# Patient Record
Sex: FEMALE | Race: WHITE | NOT HISPANIC OR LATINO | Employment: STUDENT | URBAN - METROPOLITAN AREA
[De-identification: names, ages, dates, MRNs, and addresses within clinical notes are randomized per-mention and may not be internally consistent; named-entity substitution may affect disease eponyms.]

---

## 2021-03-30 ENCOUNTER — TRANSCRIBE ORDERS (OUTPATIENT)
Dept: ADMINISTRATIVE | Facility: HOSPITAL | Age: 26
End: 2021-03-30

## 2021-03-30 DIAGNOSIS — Z13.820 ENCOUNTER FOR SCREENING FOR OSTEOPOROSIS: Primary | ICD-10-CM

## 2021-03-30 DIAGNOSIS — R19.00 INTRA-ABDOMINAL AND PELVIC SWELLING, MASS AND LUMP, UNSPECIFIED SITE: ICD-10-CM

## 2021-04-01 ENCOUNTER — HOSPITAL ENCOUNTER (OUTPATIENT)
Dept: ULTRASOUND IMAGING | Facility: CLINIC | Age: 26
Discharge: HOME | End: 2021-04-01
Payer: COMMERCIAL

## 2021-04-01 DIAGNOSIS — R19.00 INTRA-ABDOMINAL AND PELVIC SWELLING, MASS AND LUMP, UNSPECIFIED SITE: ICD-10-CM

## 2021-04-01 PROCEDURE — 76830 TRANSVAGINAL US NON-OB: CPT

## 2021-04-01 PROCEDURE — 76856 US EXAM PELVIC COMPLETE: CPT

## 2022-11-22 ENCOUNTER — OFFICE VISIT (OUTPATIENT)
Dept: INTERNAL MEDICINE CLINIC | Facility: CLINIC | Age: 27
End: 2022-11-22

## 2022-11-22 VITALS
WEIGHT: 189 LBS | RESPIRATION RATE: 18 BRPM | TEMPERATURE: 98 F | OXYGEN SATURATION: 96 % | SYSTOLIC BLOOD PRESSURE: 122 MMHG | HEART RATE: 76 BPM | DIASTOLIC BLOOD PRESSURE: 78 MMHG

## 2022-11-22 DIAGNOSIS — F90.9 ATTENTION DEFICIT HYPERACTIVITY DISORDER (ADHD), UNSPECIFIED ADHD TYPE: ICD-10-CM

## 2022-11-22 DIAGNOSIS — N80.9 ENDOMETRIOSIS: ICD-10-CM

## 2022-11-22 DIAGNOSIS — Z00.00 ANNUAL PHYSICAL EXAM: Primary | ICD-10-CM

## 2022-11-22 DIAGNOSIS — F41.9 ANXIETY: ICD-10-CM

## 2022-11-22 RX ORDER — ALPRAZOLAM 0.5 MG/1
0.5 TABLET ORAL 4 TIMES DAILY PRN
COMMUNITY
Start: 2022-11-10

## 2022-11-22 RX ORDER — DEXTROAMPHETAMINE SACCHARATE, AMPHETAMINE ASPARTATE, DEXTROAMPHETAMINE SULFATE AND AMPHETAMINE SULFATE 5; 5; 5; 5 MG/1; MG/1; MG/1; MG/1
20 TABLET ORAL
COMMUNITY

## 2022-11-22 RX ORDER — PAROXETINE 10 MG/1
10 TABLET, FILM COATED ORAL EVERY MORNING
COMMUNITY
Start: 2022-11-10

## 2022-11-22 RX ORDER — ACETAMINOPHEN AND CODEINE PHOSPHATE 120; 12 MG/5ML; MG/5ML
SOLUTION ORAL
COMMUNITY
Start: 2022-11-15

## 2022-11-22 NOTE — PROGRESS NOTES
ADULT ANNUAL [address]     NAME: Rosita Tan  AGE: 32 y o  SEX: female  : 1995     DATE: 2022     Assessment and Plan:     Problem List Items Addressed This Visit    None  Visit Diagnoses     Annual physical exam    -  Primary    Relevant Orders    Comprehensive metabolic panel    Anxiety        Attention deficit hyperactivity disorder (ADHD), unspecified ADHD type        Relevant Medications    ALPRAZolam (XANAX) 0 5 mg tablet    PARoxetine (PAXIL) 10 mg tablet    amphetamine-dextroamphetamine (ADDERALL) 20 mg tablet    Endometriosis        Relevant Orders    Ambulatory Referral to Obstetrics / Gynecology      refills provided  Referrals placed  Screening labs ordered  Immunizations and preventive care screenings were discussed with patient today  Appropriate education was printed on patient's after visit summary  Counseling:  Dental Health: discussed importance of regular tooth brushing, flossing, and dental visits  · Exercise: the importance of regular exercise/physical activity was discussed  Recommend exercise 3-5 times per week for at least 30 minutes  Return for Annual physical      Chief Complaint:     Chief Complaint   Patient presents with   • Physical Exam   • Establish Care     Pt states that she relocated here to start care also need referral to gyn a      History of Present Illness:     Adult Annual Physical   Patient here for a comprehensive physical exam    Reports hx of adhd and axienty    No longer seeing a psychiatrists  Was diagnosed by psych  Takes adderall infreuqent espically now that she is out of school   Has been on these therapies for 11 years   Breast cancer on paternal side     Diet and Physical Activity  · Diet/Nutrition: limited junk food  · Exercise: walking        Depression Screening  PHQ-2/9 Depression Screening    Little interest or pleasure in doing things: 0 - not at all  Feeling down, depressed, or hopeless: 0 - not at all  PHQ-2 Score: 0  PHQ-2 Interpretation: Negative depression screen       General Health  · Sleep: gets 4-6 hours of sleep on average  · Hearing: normal - bilateral   · Vision: wears glasses  · Dental: no dental visits for >1 year  /GYN Health  · Last menstrual period: 10/27/2022  · Contraceptive method: oral contraceptives  · History of endometrosis      Review of Systems:     Review of Systems   Respiratory: Negative for shortness of breath  Cardiovascular: Negative for chest pain  Gastrointestinal: Negative for constipation and diarrhea  Musculoskeletal: Negative for arthralgias and gait problem  Neurological: Negative for headaches  Past Medical History:     History reviewed  No pertinent past medical history     Past Surgical History:     Past Surgical History:   Procedure Laterality Date   • ENDOMETRIAL BIOPSY        Social History:     Social History     Socioeconomic History   • Marital status: Single     Spouse name: None   • Number of children: None   • Years of education: None   • Highest education level: None   Occupational History   • None   Tobacco Use   • Smoking status: Never   • Smokeless tobacco: Never   Vaping Use   • Vaping Use: Former   • Substances: Nicotine   Substance and Sexual Activity   • Alcohol use: Yes     Comment: social   • Drug use: Never   • Sexual activity: None   Other Topics Concern   • None   Social History Narrative   • None     Social Determinants of Health     Financial Resource Strain: Not on file   Food Insecurity: Not on file   Transportation Needs: Not on file   Physical Activity: Not on file   Stress: Not on file   Social Connections: Not on file   Intimate Partner Violence: Not on file   Housing Stability: Not on file      Family History:     Family History   Problem Relation Age of Onset   • Hypertension Father       Current Medications:     Current Outpatient Medications Medication Sig Dispense Refill   • ALPRAZolam (XANAX) 0 5 mg tablet Take 0 5 mg by mouth 4 (four) times a day as needed     • amphetamine-dextroamphetamine (ADDERALL) 20 mg tablet Take 20 mg by mouth 2 (two) times a day Take half once a day     • norethindrone (MICRONOR) 0 35 MG tablet      • PARoxetine (PAXIL) 10 mg tablet Take 10 mg by mouth every morning       No current facility-administered medications for this visit  Allergies: Allergies   Allergen Reactions   • Lactose - Food Allergy Abdominal Pain     Stomach pain no hives      Physical Exam:     /78 (BP Location: Left arm, Patient Position: Sitting, Cuff Size: Standard)   Pulse 76   Temp 98 °F (36 7 °C) (Temporal)   Resp 18   Wt 85 7 kg (189 lb)   SpO2 96%     Physical Exam  HENT:      Head: Normocephalic and atraumatic  Right Ear: External ear normal       Left Ear: External ear normal    Eyes:      Conjunctiva/sclera: Conjunctivae normal       Pupils: Pupils are equal, round, and reactive to light  Cardiovascular:      Rate and Rhythm: Normal rate and regular rhythm  Heart sounds: No murmur heard  Pulmonary:      Effort: Pulmonary effort is normal       Breath sounds: Normal breath sounds  Abdominal:      General: Bowel sounds are normal       Palpations: Abdomen is soft  Musculoskeletal:      Right lower leg: No edema  Left lower leg: No edema  Neurological:      Mental Status: She is alert and oriented to person, place, and time     Psychiatric:         Mood and Affect: Mood normal          Behavior: Behavior normal           Daniel Dotson DO   [facility]

## 2022-11-22 NOTE — PATIENT INSTRUCTIONS

## 2022-12-19 ENCOUNTER — TELEPHONE (OUTPATIENT)
Dept: INTERNAL MEDICINE CLINIC | Facility: CLINIC | Age: 27
End: 2022-12-19

## 2022-12-20 DIAGNOSIS — F41.9 ANXIETY: Primary | ICD-10-CM

## 2022-12-20 RX ORDER — ALPRAZOLAM 0.5 MG/1
0.5 TABLET ORAL 3 TIMES DAILY PRN
Qty: 90 TABLET | Refills: 0 | Status: SHIPPED | OUTPATIENT
Start: 2022-12-20

## 2023-01-12 ENCOUNTER — OFFICE VISIT (OUTPATIENT)
Dept: OBGYN CLINIC | Age: 28
End: 2023-01-12

## 2023-01-12 VITALS
DIASTOLIC BLOOD PRESSURE: 70 MMHG | SYSTOLIC BLOOD PRESSURE: 108 MMHG | BODY MASS INDEX: 37.5 KG/M2 | HEIGHT: 60 IN | WEIGHT: 191 LBS

## 2023-01-12 DIAGNOSIS — Z12.4 SCREENING FOR CERVICAL CANCER: ICD-10-CM

## 2023-01-12 DIAGNOSIS — Z01.419 ENCOUNTER FOR ANNUAL ROUTINE GYNECOLOGICAL EXAMINATION: Primary | ICD-10-CM

## 2023-01-12 DIAGNOSIS — N80.9 ENDOMETRIOSIS: ICD-10-CM

## 2023-01-12 RX ORDER — NORGESTIMATE AND ETHINYL ESTRADIOL 7DAYSX3 LO
1 KIT ORAL DAILY
Qty: 84 TABLET | Refills: 0 | Status: SHIPPED | OUTPATIENT
Start: 2023-01-12

## 2023-01-12 RX ORDER — ALBUTEROL SULFATE 90 UG/1
AEROSOL, METERED RESPIRATORY (INHALATION)
COMMUNITY
Start: 2022-12-07

## 2023-01-12 NOTE — PATIENT INSTRUCTIONS
Prophylactic NSAID therapy for Painful or Heavy menses     Ibuprofen or Naproxen (chose 1 or the other, do not take both), Dose as noted on the box  Typically Ibuprofen dose is 600 mg, (3 tablets) every 6-8 hours  Typically Naproxen dose is 500 mg every 12 hours  Start taking medication 2 days prior to onset of menses and continue taking through the first 3 days of menses  Make sure you take consistently this is important  You need to take with food to decrease any gastrointestinal upset effects    This is proven therapy to reduce you flow and cramping by 50 %    Life style changes that have a positive effect on painful and heavy periods are as follows   Daily physical exercise    Increase fiber, fresh fruits and vegetables in your diet    Increase daily water intake    Heating pads(do not apply directly to skin, apply over clothing or towel)   Warm Baths   Relaxation techniques, meditation, massage, yoga and mindfulness     These are all suggestion for improving your sense of frustrations with your menstrual cycle and improving your overall wellness and lifestyle   Birth Control Pills   WHAT YOU NEED TO KNOW:   What are birth control pills? Birth control pills are also called oral contraceptives, or the pill  It is medicine that helps prevent pregnancy by stopping ovulation  Ovulation is when the ovaries make and release an egg cell each month  If this egg gets fertilized by sperm, pregnancy occurs  You will need to take the pill at the same time every day  Your healthcare provider will tell you when to start taking the pill  You will also be told what to do if you miss a dose  Instructions will depend on the kind of birth control pills you are taking  What are the different kinds of birth control pills? Some kinds are taken for 21 days in a row, followed by 7 days of placebo (no hormones) pills  Other kinds are taken for 24 days followed by 4 days of placebos   Each kind has a certain amount of female hormones  Your provider will decide on the kind that is best for you based on your age and other health conditions  What may be done before I can start taking birth control pills? You need to see your healthcare provider to get a prescription  Any of the following may be done before your healthcare provider gives you a prescription:  Your healthcare provider will ask about diseases and illnesses you have had in the past  Your provider will check your risk for blood clots, heart conditions, or stroke  Tell your provider if you had gastric bypass surgery  This surgery can affect the way your body absorbs medicines such as birth control pills  Your provider will also check your blood pressure, and may do a breast and pelvic exam  A Pap smear may also be done during the pelvic exam  This is a test to make sure you do not have abnormal changes on your cervix  You may need other tests, such as a urine test to make sure you are not pregnant  Your provider will ask if you take any medicines and if you smoke  Smoking increases your risk for stroke, heart attack, or a blood clot in your lungs  If you smoke, you should not take certain kinds of birth control pills  What are the advantages of birth control pills? When birth control pills are used correctly, the chances of getting pregnant are very low  Birth control pills may help decrease bleeding and pain during your monthly period  They may also help prevent cancer of the uterus and ovaries  What are the disadvantages of birth control pills? You may have sudden changes in your mood or feelings while you take birth control pills  You may have nausea and a decreased sex drive  You may have an increased appetite and rapid weight gain  You may also have bleeding in between periods, less frequent periods, vaginal dryness, and breast pain  Birth control pills will not protect you from sexually transmitted infections   Rarely, some birth control pills can increase your risk for a blood clot  This may become life-threatening  What should I do if I decide I want to get pregnant? If you are planning to have a baby, ask your healthcare provider when you may stop taking your birth control pills  It may take some time for you to start ovulating again  Ask your healthcare provider for more information about pregnancy after birth control pills  When should I start taking birth control pills after I have a baby? If you are not breastfeeding, you may start taking birth control pills 3 weeks after you give birth  You may be able to take certain types of birth control pills if you are breastfeeding  These pills can be started from 6 weeks to 6 months after you give birth  Ask your healthcare provider for more information about when to start taking birth control pills after you give birth  What do I need to know about birth control pills and menopause? Talk with your healthcare provider if you want to take birth control pills around menopause  Around age 39, you will enter into perimenopause  This means your hormone levels are dropping and you are ovulating less often  You can still become pregnant during this time  The risk for problems, such as miscarriage, are higher if you become pregnant after age 39  Birth control pills will prevent pregnancy, and may also help prevent or relieve some signs and symptoms of menopause  Examples are hot flashes and mood swings  Your provider will do tests when you are around age 48  The tests may show that you are in menopause  If the tests do not show menopause for sure, you may be able to continue taking the pill up to age 54  The decision will depend on your health and if you have any medical conditions, such as a blood clot  Call your local emergency number (911 in the [address] [place] [address],[address]) for any of the following:    You have any of the following signs of a stroke:      Numbness or drooping on one side of your face     Weakness in an arm or leg    Confusion or difficulty speaking    Dizziness, a severe headache, or vision loss    You feel lightheaded, short of breath, and have chest pain  You cough up blood  When should I seek immediate care? Your arm or leg feels warm, tender, and painful  It may look swollen and red  You have severe pain, numbness, or swelling in your arms or legs  When should I call my doctor? You have forgotten to take a birth control pill  You have mood changes, such as depression, since starting birth control pills  You have nausea or are vomiting  You have severe abdominal pain  You missed a period and have questions or concerns about being pregnant  You still have bleeding 4 months after taking birth control pills correctly  You have questions or concerns about your condition or care  CARE AGREEMENT:   You have the right to help plan your care  Learn about your health condition and how it may be treated  Discuss treatment options with your healthcare providers to decide what care you want to receive  You always have the right to refuse treatment  The above information is an  only  It is not intended as medical advice for individual conditions or treatments  Talk to your doctor, nurse or pharmacist before following any medical regimen to see if it is safe and effective for you  © Copyright [employer] 2022 Information is for End User's use only and may not be sold, redistributed or otherwise used for commercial purposes   All illustrations and images included in CareNotes® are the copyrighted property of A D A M , Inc  or [address] [place] [address] [facility]

## 2023-01-12 NOTE — PROGRESS NOTES
Bernardo Melton  1995    Assessment and Plan:  Yearly exam    Discussed switching BCM - patient opted for a trial of OCPs  Discussed ACHES  F/u in 3 months for pill check  NSAIDs recommended for dysmenorrhea   ASCCP guidelines reviewed- pap collected today    Perineal hygiene reviewed  Kegel exercises recommended  SBE, daily exercise and healthy diet with adequate calcium and vitamin D encouraged  Weight bearing exercises a minimum of 150 minutes/week advised  Advised to call with any issues, all concerns & questions addressed  See provided information in your after visit summary     Jamaal Gauthier was seen today for gynecologic exam     Diagnoses and all orders for this visit:    Encounter for annual routine gynecological examination    Screening for cervical cancer  -     Liquid-based pap, screening    Endometriosis  -     Ambulatory Referral to Obstetrics / Gynecology  -     norgestimate-ethinyl estradiol (Ortho Tri-Cyclen Lo) 0 18/0 215/0 25 MG-25 MCG per tablet; Take 1 tablet by mouth daily      F/U in 3 months for pill check    Health Maintenance:    Last PAP: Not on file  Denies history of abnormal paps  Next PAP Due: collected today  Gardasil Vaccine Series: She has had the Gardasil series  Subjective    CC: Yearly Exam     Bernardo Melton is a 32 y o  female  here for an annual exam       Eliceo Claire  Patient's last menstrual period was 12/27/2022  Sexual activity: She is sexually active without pain, bleeding or dryness  Contraception: POPs  STD testing:  She does not want STD testing today  former smoker    History of laparoscopy diagnosed endometriosis  Currently on POPs  Reports worsening pelvic pain over the last couple months with her periods and midcycle  Menstrual cycle is regular  Periods typically last 7 days       She denies breast concerns, abnormal vaginal discharge, vaginal itching, odor, irritation, bowel/bladder dysfunction, urinary symptoms, pelvic pain, or dyspareunia today  Family hx of breast cancer: Yes Paternal grandmother  Family hx of ovarian cancer: No  Family hx of colon cancer: No    Past Medical History:   Diagnosis Date   • Endometriosis      Past Surgical History:   Procedure Laterality Date   • ENDOMETRIAL BIOPSY     • LAPAROSCOPY     • NOSE SURGERY     • TONSILLECTOMY         Immunization History   Administered Date(s) Administered   • COVID-19 PFIZER VACCINE 0 3 ML IM 2022       Family History   Problem Relation Age of Onset   • Hypertension Father      Social History     Tobacco Use   • Smoking status: Never   • Smokeless tobacco: Never   • Tobacco comments:     Stopped vaping 2023!!    Vaping Use   • Vaping Use: Former   • Substances: Nicotine   Substance Use Topics   • Alcohol use: Yes     Comment: social   • Drug use: Never       Current Outpatient Medications:   •  norgestimate-ethinyl estradiol (Ortho Tri-Cyclen Lo) 0 18/0 215/0 25 MG-25 MCG per tablet, Take 1 tablet by mouth daily, Disp: 84 tablet, Rfl: 0  •  albuterol (PROVENTIL HFA,VENTOLIN HFA) 90 mcg/act inhaler, TAKE 2 PUFFS (INHALATION) EVERY 4 TO 6 HOURS FOR 10 DAYS, Disp: , Rfl:   •  ALPRAZolam (XANAX) 0 5 mg tablet, Take 1 tablet (0 5 mg total) by mouth 3 (three) times a day as needed for anxiety, Disp: 90 tablet, Rfl: 0  •  amphetamine-dextroamphetamine (ADDERALL) 20 mg tablet, Take 20 mg by mouth 2 (two) times a day Take half once a day, Disp: , Rfl:   •  PARoxetine (PAXIL) 10 mg tablet, Take 10 mg by mouth every morning, Disp: , Rfl:   Patient Active Problem List    Diagnosis Date Noted   • Endometriosis 04/15/2019       Allergies   Allergen Reactions   • Lactose - Food Allergy Abdominal Pain     Stomach pain no hives       OB History    Para Term  AB Living   0 0 0 0 0 0   SAB IAB Ectopic Multiple Live Births   0 0 0 0 0       Vitals:    23 0700   BP: 108/70   Weight: 86 6 kg (191 lb)   Height: 5' (1 524 m)     Body mass index is 37 3 kg/m²  Review of Systems   Constitutional: Negative for chills, fatigue, fever and unexpected weight change  Respiratory: Negative for shortness of breath  Cardiovascular: Negative for chest pain  Gastrointestinal: Negative for abdominal pain, constipation, diarrhea, nausea and vomiting  Endocrine: Negative  Genitourinary: Positive for pelvic pain  Negative for difficulty urinating, dyspareunia, dysuria, frequency, genital sores, hematuria, menstrual problem, urgency, vaginal bleeding, vaginal discharge and vaginal pain  Musculoskeletal: Negative for back pain and myalgias  Skin: Negative for pallor and rash  Neurological: Negative for headaches  Psychiatric/Behavioral: Negative for dysphoric mood  All other systems reviewed and are negative  Physical Exam  Constitutional:       General: She is not in acute distress  Appearance: Normal appearance  She is not ill-appearing  Genitourinary:      Bladder and urethral meatus normal       No lesions in the vagina  Right Labia: No rash, tenderness, lesions, skin changes or Bartholin's cyst      Left Labia: No tenderness, lesions, skin changes, Bartholin's cyst or rash  No inguinal adenopathy present in the right or left side  No vaginal discharge, erythema, tenderness, bleeding or ulceration  Right Adnexa: not tender, not full and no mass present  Left Adnexa: not tender, not full and no mass present  No cervical motion tenderness, discharge, friability, lesion, polyp or nabothian cyst       Uterus is not enlarged, fixed or tender  No uterine mass detected  No urethral prolapse, tenderness or discharge present  Bladder is not tender and urgency on palpation not present  Pelvic exam was performed with patient in the lithotomy position  Breasts:     Right: No swelling, inverted nipple, mass, nipple discharge, skin change or tenderness        Left: No swelling, inverted nipple, mass, nipple discharge, skin change or tenderness  HENT:      Head: Normocephalic and atraumatic  Eyes:      Conjunctiva/sclera: Conjunctivae normal    Pulmonary:      Effort: Pulmonary effort is normal    Abdominal:      General: There is no distension  Palpations: Abdomen is soft  Tenderness: There is no abdominal tenderness  Musculoskeletal:         General: Normal range of motion  Cervical back: Neck supple  Lymphadenopathy:      Upper Body:      Right upper body: No supraclavicular or axillary adenopathy  Left upper body: No supraclavicular or axillary adenopathy  Lower Body: No right inguinal adenopathy  No left inguinal adenopathy  Neurological:      Mental Status: She is alert and oriented to person, place, and time  Skin:     General: Skin is warm and dry  Psychiatric:         Mood and Affect: Mood normal          Behavior: Behavior normal          Thought Content: Thought content normal          Judgment: Judgment normal    Vitals and nursing note reviewed

## 2023-01-20 LAB
LAB AP GYN PRIMARY INTERPRETATION: NORMAL
Lab: NORMAL

## 2023-01-25 DIAGNOSIS — F41.9 ANXIETY: ICD-10-CM

## 2023-01-25 RX ORDER — ALPRAZOLAM 0.5 MG/1
TABLET ORAL
Qty: 90 TABLET | Refills: 0 | Status: SHIPPED | OUTPATIENT
Start: 2023-01-25

## 2023-02-27 DIAGNOSIS — F41.9 ANXIETY: ICD-10-CM

## 2023-02-28 RX ORDER — ALPRAZOLAM 0.5 MG/1
TABLET ORAL
Qty: 90 TABLET | Refills: 0 | Status: SHIPPED | OUTPATIENT
Start: 2023-02-28

## 2023-03-07 ENCOUNTER — TELEPHONE (OUTPATIENT)
Dept: INTERNAL MEDICINE CLINIC | Facility: CLINIC | Age: 28
End: 2023-03-07

## 2023-03-07 DIAGNOSIS — F90.9 ATTENTION DEFICIT HYPERACTIVITY DISORDER (ADHD), UNSPECIFIED ADHD TYPE: Primary | ICD-10-CM

## 2023-03-08 RX ORDER — PAROXETINE 10 MG/1
10 TABLET, FILM COATED ORAL EVERY MORNING
Qty: 30 TABLET | Refills: 0 | Status: SHIPPED | OUTPATIENT
Start: 2023-03-08

## 2023-03-30 DIAGNOSIS — F41.9 ANXIETY: ICD-10-CM

## 2023-03-31 DIAGNOSIS — F90.9 ATTENTION DEFICIT HYPERACTIVITY DISORDER (ADHD), UNSPECIFIED ADHD TYPE: ICD-10-CM

## 2023-03-31 RX ORDER — PAROXETINE 10 MG/1
10 TABLET, FILM COATED ORAL EVERY MORNING
Qty: 30 TABLET | Refills: 0 | Status: SHIPPED | OUTPATIENT
Start: 2023-03-31

## 2023-03-31 RX ORDER — ALPRAZOLAM 0.5 MG/1
TABLET ORAL
Qty: 90 TABLET | Refills: 0 | Status: SHIPPED | OUTPATIENT
Start: 2023-03-31

## 2023-04-05 DIAGNOSIS — N80.9 ENDOMETRIOSIS: ICD-10-CM

## 2023-04-05 RX ORDER — NORGESTIMATE AND ETHINYL ESTRADIOL 7DAYSX3 LO
1 KIT ORAL DAILY
Qty: 28 TABLET | Refills: 0 | Status: SHIPPED | OUTPATIENT
Start: 2023-04-05 | End: 2023-04-05 | Stop reason: SDUPTHER

## 2023-04-05 RX ORDER — NORGESTIMATE AND ETHINYL ESTRADIOL 7DAYSX3 LO
1 KIT ORAL DAILY
Qty: 28 TABLET | Refills: 0 | Status: SHIPPED | OUTPATIENT
Start: 2023-04-05

## 2023-04-19 PROBLEM — Z30.41 ENCOUNTER FOR SURVEILLANCE OF CONTRACEPTIVE PILLS: Status: ACTIVE | Noted: 2023-04-19

## 2023-05-01 DIAGNOSIS — F41.9 ANXIETY: ICD-10-CM

## 2023-05-02 DIAGNOSIS — F41.9 ANXIETY: ICD-10-CM

## 2023-05-02 RX ORDER — ALPRAZOLAM 0.5 MG/1
0.5 TABLET ORAL 3 TIMES DAILY PRN
Qty: 90 TABLET | Refills: 0 | OUTPATIENT
Start: 2023-05-02

## 2023-05-02 RX ORDER — ALPRAZOLAM 0.5 MG/1
TABLET ORAL
Qty: 90 TABLET | Refills: 0 | Status: SHIPPED | OUTPATIENT
Start: 2023-05-02

## 2023-05-24 DIAGNOSIS — F90.9 ATTENTION DEFICIT HYPERACTIVITY DISORDER (ADHD), UNSPECIFIED ADHD TYPE: ICD-10-CM

## 2023-05-25 RX ORDER — PAROXETINE 10 MG/1
10 TABLET, FILM COATED ORAL EVERY MORNING
Qty: 90 TABLET | Refills: 1 | Status: SHIPPED | OUTPATIENT
Start: 2023-05-25

## 2023-06-01 DIAGNOSIS — F41.9 ANXIETY: ICD-10-CM

## 2023-06-02 RX ORDER — ALPRAZOLAM 0.5 MG/1
0.5 TABLET ORAL 3 TIMES DAILY PRN
Qty: 90 TABLET | Refills: 0 | Status: SHIPPED | OUTPATIENT
Start: 2023-06-02

## 2023-07-03 DIAGNOSIS — F41.9 ANXIETY: ICD-10-CM

## 2023-07-03 RX ORDER — ALPRAZOLAM 0.5 MG/1
0.5 TABLET ORAL 3 TIMES DAILY PRN
Qty: 90 TABLET | Refills: 0 | Status: SHIPPED | OUTPATIENT
Start: 2023-07-03

## 2023-08-01 NOTE — TELEPHONE ENCOUNTER
The XANAX 0 point 5 was refilled on 5/2, but with her local pharmacy in [place]     Pt is out of town, visiting family for a wedding    Please send to:   Nani Sanchez 77  P#: [phone] 01-Aug-2023 17:17

## 2023-08-07 DIAGNOSIS — F41.9 ANXIETY: ICD-10-CM

## 2023-08-08 ENCOUNTER — TELEPHONE (OUTPATIENT)
Age: 28
End: 2023-08-08

## 2023-08-08 RX ORDER — ALPRAZOLAM 0.5 MG/1
0.5 TABLET ORAL 3 TIMES DAILY PRN
Qty: 90 TABLET | Refills: 0 | Status: SHIPPED | OUTPATIENT
Start: 2023-08-08

## 2023-09-06 DIAGNOSIS — F41.9 ANXIETY: ICD-10-CM

## 2023-09-07 RX ORDER — ALPRAZOLAM 0.5 MG/1
0.5 TABLET ORAL 3 TIMES DAILY PRN
Qty: 90 TABLET | Refills: 0 | Status: SHIPPED | OUTPATIENT
Start: 2023-09-07

## 2023-10-06 DIAGNOSIS — F41.9 ANXIETY: ICD-10-CM

## 2023-10-10 RX ORDER — ALPRAZOLAM 0.5 MG/1
0.5 TABLET ORAL 3 TIMES DAILY PRN
Qty: 90 TABLET | Refills: 0 | OUTPATIENT
Start: 2023-10-10

## 2023-10-11 ENCOUNTER — OFFICE VISIT (OUTPATIENT)
Age: 28
End: 2023-10-11
Payer: COMMERCIAL

## 2023-10-11 VITALS
HEIGHT: 60 IN | SYSTOLIC BLOOD PRESSURE: 102 MMHG | TEMPERATURE: 97 F | BODY MASS INDEX: 36.52 KG/M2 | HEART RATE: 66 BPM | DIASTOLIC BLOOD PRESSURE: 68 MMHG | WEIGHT: 186 LBS | OXYGEN SATURATION: 99 % | RESPIRATION RATE: 18 BRPM

## 2023-10-11 DIAGNOSIS — Z13.6 SCREENING FOR CARDIOVASCULAR CONDITION: ICD-10-CM

## 2023-10-11 DIAGNOSIS — Z00.00 ANNUAL PHYSICAL EXAM: ICD-10-CM

## 2023-10-11 DIAGNOSIS — L30.8 OTHER ECZEMA: ICD-10-CM

## 2023-10-11 DIAGNOSIS — L40.9 PSORIASIS: ICD-10-CM

## 2023-10-11 DIAGNOSIS — F41.9 ANXIETY: Primary | ICD-10-CM

## 2023-10-11 PROBLEM — F32.A ANXIETY AND DEPRESSION: Status: ACTIVE | Noted: 2023-10-11

## 2023-10-11 PROBLEM — L30.9 ECZEMA: Status: ACTIVE | Noted: 2023-10-11

## 2023-10-11 PROCEDURE — 99213 OFFICE O/P EST LOW 20 MIN: CPT

## 2023-10-11 RX ORDER — CLOBETASOL PROPIONATE 0.5 MG/G
CREAM TOPICAL 2 TIMES DAILY
Qty: 60 G | Refills: 0 | Status: SHIPPED | OUTPATIENT
Start: 2023-10-11

## 2023-10-11 RX ORDER — ALPRAZOLAM 0.5 MG/1
0.5 TABLET ORAL 3 TIMES DAILY PRN
Qty: 90 TABLET | Refills: 0 | Status: SHIPPED | OUTPATIENT
Start: 2023-10-11

## 2023-10-11 NOTE — PROGRESS NOTES
Name: Eduardo Goodman      : 1995      MRN: [id number]  Encounter Provider: ALBERTO Pabon  Encounter Date: 10/11/2023   Encounter department: [address]      1. Anxiety  Here for medication check, denies acute flare. Stable with Paxil and xanax as needed. - ALPRAZolam (XANAX) 0.5 mg tablet; Take 1 tablet (0.5 mg total) by mouth 3 (three) times a day as needed for anxiety  Dispense: 90 tablet; Refill: 0    2. Other eczema  No relief with triamcinolone. - clobetasol (TEMOVATE) 0.05 % cream; Apply topically 2 (two) times a day  Dispense: 60 g; Refill: 0    3. Psoriasis  Itchy, dry, silvery patches on the  right planter and rhodes surface, no relief from topical steroids. - Ambulatory Referral to Dermatology; Future  - Roflumilast 0.3 % CREA; Apply 1 Application topically 1 (one) time for 1 dose  Dispense: 60 g; Refill: 0    4. Screening for cardiovascular condition  Blood work ordered to be completed before annual visit  - Lipid Panel with Direct LDL reflex; Future    5. Annual physical exam    - CBC and differential; Future  - Lipid Panel with Direct LDL reflex; Future  - TSH, 3rd generation with Free T4 reflex; Future    6. BMI 36.0-36.9,adult  - Lipid Panel with Direct LDL reflex; Future  - TSH, 3rd generation with Free T4 reflex; Future     Subjective      Rash  This is a chronic problem. The current episode started more than 1 year ago. The problem has been waxing and waning since onset. The affected locations include the scalp, right hand and right foot. The problem is moderate. The rash is characterized by scaling, peeling and itchiness. She was exposed to nothing. Associated symptoms include itching. Pertinent negatives include no anorexia, cough, fatigue, fever, shortness of breath, sore throat or vomiting. Past treatments include topical steroids. The treatment provided no relief. Her past medical history is significant for eczema. Review of Systems   Constitutional:  Negative for chills, fatigue and fever. HENT: Negative. Negative for ear pain and sore throat. Eyes:  Negative for pain and visual disturbance. Respiratory:  Negative for cough and shortness of breath. Cardiovascular:  Negative for chest pain and palpitations. Gastrointestinal:  Negative for abdominal pain, anorexia and vomiting. Genitourinary: Negative. Musculoskeletal:  Negative for arthralgias and back pain. Skin:  Positive for itching and rash. Negative for color change. Neurological: Negative. Negative for seizures and syncope. Psychiatric/Behavioral:  The patient is nervous/anxious. All other systems reviewed and are negative. Current Outpatient Medications on File Prior to Visit   Medication Sig   • norethindrone (Rukhsana) 0.35 MG tablet Take 1 tablet (0.35 mg total) by mouth daily   • PARoxetine (PAXIL) 10 mg tablet TAKE 1 TABLET (10 MG TOTAL) BY MOUTH EVERY MORNING. • [DISCONTINUED] ALPRAZolam (XANAX) 0.5 mg tablet Take 1 tablet (0.5 mg total) by mouth 3 (three) times a day as needed for anxiety   • [DISCONTINUED] amphetamine-dextroamphetamine (ADDERALL) 20 mg tablet Take 20 mg by mouth 2 (two) times a day Take half once a day   • albuterol (PROVENTIL HFA,VENTOLIN HFA) 90 mcg/act inhaler TAKE 2 PUFFS (INHALATION) EVERY 4 TO 6 HOURS FOR 10 DAYS (Patient not taking: Reported on 4/19/2023)       Objective     /68 (BP Location: Left arm, Patient Position: Sitting, Cuff Size: Standard)   Pulse 66   Temp (!) 97 °F (36.1 °C) (Tympanic)   Resp 18   Ht 5' (1.524 m)   Wt 84.4 kg (186 lb)   SpO2 99%   BMI 36.33 kg/m²     Physical Exam  Vitals and nursing note reviewed. Constitutional:       Appearance: Normal appearance. HENT:      Head: Normocephalic and atraumatic. Right Ear: Tympanic membrane normal.      Left Ear: Tympanic membrane normal.   Eyes:      Extraocular Movements: Extraocular movements intact.       Pupils: Pupils are equal, round, and reactive to light. Cardiovascular:      Rate and Rhythm: Normal rate and regular rhythm. Pulses: Normal pulses. Heart sounds: Normal heart sounds. Pulmonary:      Effort: Pulmonary effort is normal.      Breath sounds: Normal breath sounds. Musculoskeletal:         General: Normal range of motion. Cervical back: Normal range of motion and neck supple. Skin:     General: Skin is warm and dry. Findings: Rash present. Neurological:      General: No focal deficit present. Mental Status: She is alert and oriented to person, place, and time.    Psychiatric:         Mood and Affect: Mood normal.         Behavior: Behavior normal.       ALBERTO Brenner

## 2023-10-13 ENCOUNTER — TELEPHONE (OUTPATIENT)
Age: 28
End: 2023-10-13

## 2023-11-13 DIAGNOSIS — F41.9 ANXIETY: ICD-10-CM

## 2023-11-13 NOTE — TELEPHONE ENCOUNTER
Medication:Xanax        Medication failed [facility] protocol. Please forward to your office staff for further review as this medication was reviewed by a [facility] RN.

## 2023-11-15 RX ORDER — ALPRAZOLAM 0.5 MG/1
0.5 TABLET ORAL 3 TIMES DAILY PRN
Qty: 90 TABLET | Refills: 0 | Status: SHIPPED | OUTPATIENT
Start: 2023-11-15

## 2023-12-02 DIAGNOSIS — F90.9 ATTENTION DEFICIT HYPERACTIVITY DISORDER (ADHD), UNSPECIFIED ADHD TYPE: ICD-10-CM

## 2023-12-02 RX ORDER — PAROXETINE 10 MG/1
10 TABLET, FILM COATED ORAL EVERY MORNING
Qty: 90 TABLET | Refills: 1 | Status: SHIPPED | OUTPATIENT
Start: 2023-12-02

## 2023-12-19 DIAGNOSIS — F41.9 ANXIETY: ICD-10-CM

## 2023-12-20 DIAGNOSIS — F41.9 ANXIETY: ICD-10-CM

## 2023-12-21 RX ORDER — ALPRAZOLAM 0.5 MG/1
0.5 TABLET ORAL 3 TIMES DAILY PRN
Qty: 90 TABLET | Refills: 0 | Status: SHIPPED | OUTPATIENT
Start: 2023-12-21

## 2023-12-24 RX ORDER — ALPRAZOLAM 0.5 MG/1
0.5 TABLET ORAL 3 TIMES DAILY PRN
Qty: 90 TABLET | Refills: 0 | OUTPATIENT
Start: 2023-12-24

## 2024-01-22 DIAGNOSIS — F41.9 ANXIETY: ICD-10-CM

## 2024-01-23 RX ORDER — ALPRAZOLAM 0.5 MG/1
0.5 TABLET ORAL 3 TIMES DAILY PRN
Qty: 90 TABLET | Refills: 0 | Status: SHIPPED | OUTPATIENT
Start: 2024-01-23

## 2024-01-25 ENCOUNTER — APPOINTMENT (EMERGENCY)
Dept: ULTRASOUND IMAGING | Facility: HOSPITAL | Age: 29
End: 2024-01-25
Payer: COMMERCIAL

## 2024-01-25 ENCOUNTER — HOSPITAL ENCOUNTER (EMERGENCY)
Facility: HOSPITAL | Age: 29
Discharge: HOME | End: 2024-01-25
Attending: EMERGENCY MEDICINE
Payer: COMMERCIAL

## 2024-01-25 ENCOUNTER — APPOINTMENT (EMERGENCY)
Dept: CT IMAGING | Facility: HOSPITAL | Age: 29
End: 2024-01-25
Payer: COMMERCIAL

## 2024-01-25 ENCOUNTER — TELEPHONE (OUTPATIENT)
Dept: OBGYN CLINIC | Facility: CLINIC | Age: 29
End: 2024-01-25

## 2024-01-25 VITALS
DIASTOLIC BLOOD PRESSURE: 60 MMHG | SYSTOLIC BLOOD PRESSURE: 122 MMHG | HEART RATE: 71 BPM | RESPIRATION RATE: 18 BRPM | OXYGEN SATURATION: 99 % | TEMPERATURE: 98.7 F

## 2024-01-25 DIAGNOSIS — R10.30 LOWER ABDOMINAL PAIN: ICD-10-CM

## 2024-01-25 DIAGNOSIS — N83.201 RIGHT OVARIAN CYST: Primary | ICD-10-CM

## 2024-01-25 DIAGNOSIS — R11.0 NAUSEA: ICD-10-CM

## 2024-01-25 LAB
ALBUMIN SERPL BCP-MCNC: 4.3 G/DL (ref 3.5–5)
ALP SERPL-CCNC: 54 U/L (ref 34–104)
ALT SERPL W P-5'-P-CCNC: 13 U/L (ref 7–52)
AMORPH URATE CRY URNS QL MICRO: NORMAL
ANION GAP SERPL CALCULATED.3IONS-SCNC: 5 MMOL/L
AST SERPL W P-5'-P-CCNC: 15 U/L (ref 13–39)
BACTERIA UR QL AUTO: NORMAL /HPF
BASOPHILS # BLD AUTO: 0.04 THOUSANDS/ÂΜL (ref 0–0.1)
BASOPHILS NFR BLD AUTO: 1 % (ref 0–1)
BILIRUB DIRECT SERPL-MCNC: 0.04 MG/DL (ref 0–0.2)
BILIRUB SERPL-MCNC: 0.26 MG/DL (ref 0.2–1)
BILIRUB UR QL STRIP: NEGATIVE
BUN SERPL-MCNC: 12 MG/DL (ref 5–25)
CALCIUM SERPL-MCNC: 9.3 MG/DL (ref 8.4–10.2)
CHLORIDE SERPL-SCNC: 106 MMOL/L (ref 96–108)
CLARITY UR: ABNORMAL
CO2 SERPL-SCNC: 27 MMOL/L (ref 21–32)
COLOR UR: YELLOW
CREAT SERPL-MCNC: 0.79 MG/DL (ref 0.6–1.3)
EOSINOPHIL # BLD AUTO: 0.06 THOUSAND/ÂΜL (ref 0–0.61)
EOSINOPHIL NFR BLD AUTO: 1 % (ref 0–6)
ERYTHROCYTE [DISTWIDTH] IN BLOOD BY AUTOMATED COUNT: 11.7 % (ref 11.6–15.1)
EXT PREGNANCY TEST URINE: NEGATIVE
EXT. CONTROL: NORMAL
GFR SERPL CREATININE-BSD FRML MDRD: 102 ML/MIN/1.73SQ M
GLUCOSE SERPL-MCNC: 96 MG/DL (ref 65–140)
GLUCOSE UR STRIP-MCNC: NEGATIVE MG/DL
HCT VFR BLD AUTO: 38.9 % (ref 34.8–46.1)
HGB BLD-MCNC: 13.3 G/DL (ref 11.5–15.4)
HGB UR QL STRIP.AUTO: NEGATIVE
IMM GRANULOCYTES # BLD AUTO: 0.02 THOUSAND/UL (ref 0–0.2)
IMM GRANULOCYTES NFR BLD AUTO: 0 % (ref 0–2)
KETONES UR STRIP-MCNC: NEGATIVE MG/DL
LEUKOCYTE ESTERASE UR QL STRIP: NEGATIVE
LIPASE SERPL-CCNC: 35 U/L (ref 11–82)
LYMPHOCYTES # BLD AUTO: 2.69 THOUSANDS/ÂΜL (ref 0.6–4.47)
LYMPHOCYTES NFR BLD AUTO: 31 % (ref 14–44)
MCH RBC QN AUTO: 31.7 PG (ref 26.8–34.3)
MCHC RBC AUTO-ENTMCNC: 34.2 G/DL (ref 31.4–37.4)
MCV RBC AUTO: 93 FL (ref 82–98)
MONOCYTES # BLD AUTO: 0.66 THOUSAND/ÂΜL (ref 0.17–1.22)
MONOCYTES NFR BLD AUTO: 8 % (ref 4–12)
NEUTROPHILS # BLD AUTO: 5.31 THOUSANDS/ÂΜL (ref 1.85–7.62)
NEUTS SEG NFR BLD AUTO: 59 % (ref 43–75)
NITRITE UR QL STRIP: NEGATIVE
NON-SQ EPI CELLS URNS QL MICRO: NORMAL /HPF
NRBC BLD AUTO-RTO: 0 /100 WBCS
PH UR STRIP.AUTO: 7.5 [PH]
PLATELET # BLD AUTO: 228 THOUSANDS/UL (ref 149–390)
PMV BLD AUTO: 11.2 FL (ref 8.9–12.7)
POTASSIUM SERPL-SCNC: 4 MMOL/L (ref 3.5–5.3)
PROT SERPL-MCNC: 6.9 G/DL (ref 6.4–8.4)
PROT UR STRIP-MCNC: ABNORMAL MG/DL
RBC # BLD AUTO: 4.2 MILLION/UL (ref 3.81–5.12)
RBC #/AREA URNS AUTO: NORMAL /HPF
SODIUM SERPL-SCNC: 138 MMOL/L (ref 135–147)
SP GR UR STRIP.AUTO: 1.02 (ref 1–1.03)
UROBILINOGEN UR STRIP-ACNC: <2 MG/DL
WBC # BLD AUTO: 8.78 THOUSAND/UL (ref 4.31–10.16)
WBC #/AREA URNS AUTO: NORMAL /HPF

## 2024-01-25 PROCEDURE — 99285 EMERGENCY DEPT VISIT HI MDM: CPT | Performed by: EMERGENCY MEDICINE

## 2024-01-25 PROCEDURE — 96365 THER/PROPH/DIAG IV INF INIT: CPT

## 2024-01-25 PROCEDURE — 81001 URINALYSIS AUTO W/SCOPE: CPT | Performed by: EMERGENCY MEDICINE

## 2024-01-25 PROCEDURE — 85025 COMPLETE CBC W/AUTO DIFF WBC: CPT | Performed by: EMERGENCY MEDICINE

## 2024-01-25 PROCEDURE — 76830 TRANSVAGINAL US NON-OB: CPT

## 2024-01-25 PROCEDURE — 80076 HEPATIC FUNCTION PANEL: CPT | Performed by: EMERGENCY MEDICINE

## 2024-01-25 PROCEDURE — 99284 EMERGENCY DEPT VISIT MOD MDM: CPT

## 2024-01-25 PROCEDURE — 81025 URINE PREGNANCY TEST: CPT | Performed by: EMERGENCY MEDICINE

## 2024-01-25 PROCEDURE — 76856 US EXAM PELVIC COMPLETE: CPT

## 2024-01-25 PROCEDURE — G1004 CDSM NDSC: HCPCS

## 2024-01-25 PROCEDURE — 83690 ASSAY OF LIPASE: CPT | Performed by: EMERGENCY MEDICINE

## 2024-01-25 PROCEDURE — 36415 COLL VENOUS BLD VENIPUNCTURE: CPT | Performed by: EMERGENCY MEDICINE

## 2024-01-25 PROCEDURE — 96375 TX/PRO/DX INJ NEW DRUG ADDON: CPT

## 2024-01-25 PROCEDURE — 74177 CT ABD & PELVIS W/CONTRAST: CPT

## 2024-01-25 PROCEDURE — 80048 BASIC METABOLIC PNL TOTAL CA: CPT | Performed by: EMERGENCY MEDICINE

## 2024-01-25 RX ORDER — ONDANSETRON 2 MG/ML
4 INJECTION INTRAMUSCULAR; INTRAVENOUS ONCE
Status: COMPLETED | OUTPATIENT
Start: 2024-01-25 | End: 2024-01-25

## 2024-01-25 RX ORDER — IBUPROFEN 600 MG/1
600 TABLET ORAL EVERY 6 HOURS PRN
Qty: 30 TABLET | Refills: 0 | Status: SHIPPED | OUTPATIENT
Start: 2024-01-25

## 2024-01-25 RX ORDER — ONDANSETRON 4 MG/1
4 TABLET, ORALLY DISINTEGRATING ORAL EVERY 6 HOURS PRN
Qty: 20 TABLET | Refills: 0 | Status: SHIPPED | OUTPATIENT
Start: 2024-01-25

## 2024-01-25 RX ORDER — KETOROLAC TROMETHAMINE 30 MG/ML
15 INJECTION, SOLUTION INTRAMUSCULAR; INTRAVENOUS ONCE
Status: COMPLETED | OUTPATIENT
Start: 2024-01-25 | End: 2024-01-25

## 2024-01-25 RX ADMIN — KETOROLAC TROMETHAMINE 15 MG: 30 INJECTION, SOLUTION INTRAMUSCULAR; INTRAVENOUS at 14:58

## 2024-01-25 RX ADMIN — SODIUM CHLORIDE, SODIUM LACTATE, POTASSIUM CHLORIDE, AND CALCIUM CHLORIDE 1000 ML: .6; .31; .03; .02 INJECTION, SOLUTION INTRAVENOUS at 14:59

## 2024-01-25 RX ADMIN — ONDANSETRON 4 MG: 2 INJECTION INTRAMUSCULAR; INTRAVENOUS at 14:58

## 2024-01-25 RX ADMIN — IOHEXOL 100 ML: 350 INJECTION, SOLUTION INTRAVENOUS at 15:38

## 2024-01-25 NOTE — TELEPHONE ENCOUNTER
Spoke to patient pain is 8/10- taking advil dual action. Nausea, feels faint, very swollen ( looking pregnant).   Eating less-     She as diagnosed 2019- she did start OC's it regulated her much better than she was- and flow lessened.     Advised ER. She said she feels ok to drive. Shes agreeable to ER due to pain but she is a  so does not want any strong pain medications.    Last US was 4/2021- let her know Yanely is off toddy but I will keep her posted and let us know if she needs anything at all.

## 2024-01-25 NOTE — DISCHARGE INSTRUCTIONS
Take the ibuprofen as per the instructions.  Do not mix it with additional anti-inflammatories (Motrin, Advil, naproxen, Aleve).  Continue to use the heating pad as it helps.  Drink plenty of fluids, and eat as you are able to tolerate.  Follow-up with your gynecologist for further evaluation and management of your pain.  Return if you develop severe worsening of pain that does not improve as is usual when you have had a ruptured cyst before, or recurs after improvement, or for any other concerns.

## 2024-01-25 NOTE — TELEPHONE ENCOUNTER
Pt last seen 4/19/2023 with Yanely,  pt in severe abdominal pain,  has endometriosis,  please call pt to advise,   Thanks

## 2024-01-25 NOTE — ED PROVIDER NOTES
History  Chief Complaint   Patient presents with    Abdominal Pain     Pt presents with c/o increasing pain in her abdomen, hx of endometriosis. Also c/o nausea and chills.       Abdominal Pain      Prior to Admission Medications   Prescriptions Last Dose Informant Patient Reported? Taking?   ALPRAZolam (XANAX) 0.5 mg tablet   No No   Sig: Take 1 tablet (0.5 mg total) by mouth 3 (three) times a day as needed for anxiety   PARoxetine (PAXIL) 10 mg tablet   No No   Sig: TAKE 1 TABLET (10 MG TOTAL) BY MOUTH EVERY MORNING.   albuterol (PROVENTIL HFA,VENTOLIN HFA) 90 mcg/act inhaler   Yes No   Sig: TAKE 2 PUFFS (INHALATION) EVERY 4 TO 6 HOURS FOR 10 DAYS   Patient not taking: Reported on 4/19/2023   clobetasol (TEMOVATE) 0.05 % cream   No No   Sig: Apply topically 2 (two) times a day   norethindrone (Rukhsana) 0.35 MG tablet   No No   Sig: Take 1 tablet (0.35 mg total) by mouth daily      Facility-Administered Medications: None       Past Medical History:   Diagnosis Date    Endometriosis        Past Surgical History:   Procedure Laterality Date    ENDOMETRIAL BIOPSY      LAPAROSCOPY  2020    NOSE SURGERY      TONSILLECTOMY         Family History   Problem Relation Age of Onset    Hypertension Father      I have reviewed and agree with the history as documented.    E-Cigarette/Vaping    E-Cigarette Use Former User      E-Cigarette/Vaping Substances    Nicotine Yes      Social History     Tobacco Use    Smoking status: Never    Smokeless tobacco: Never    Tobacco comments:     Stopped vaping 1/11/2023!!   Vaping Use    Vaping status: Former    Substances: Nicotine   Substance Use Topics    Alcohol use: Yes     Comment: social    Drug use: Never       Review of Systems   Gastrointestinal:  Positive for abdominal pain.       Physical Exam  Physical Exam  Vitals and nursing note reviewed.   Constitutional:       General: She is not in acute distress.     Appearance: She is well-developed.   HENT:      Head: Normocephalic and  atraumatic.   Eyes:      Conjunctiva/sclera: Conjunctivae normal.      Pupils: Pupils are equal, round, and reactive to light.   Neck:      Trachea: No tracheal deviation.   Cardiovascular:      Rate and Rhythm: Normal rate and regular rhythm.      Heart sounds: Normal heart sounds.   Pulmonary:      Effort: Pulmonary effort is normal. No respiratory distress.      Breath sounds: Normal breath sounds.   Abdominal:      General: A surgical scar is present. Bowel sounds are normal. There is no distension.      Palpations: Abdomen is soft.      Tenderness: There is generalized abdominal tenderness (moderate across lower abdomen, mild upper abdomen). There is no right CVA tenderness, left CVA tenderness, guarding or rebound. Negative signs include Lozano's sign.   Musculoskeletal:      Cervical back: Normal range of motion.   Skin:     General: Skin is warm and dry.   Neurological:      Mental Status: She is alert and oriented to person, place, and time.      GCS: GCS eye subscore is 4. GCS verbal subscore is 5. GCS motor subscore is 6.   Psychiatric:         Mood and Affect: Mood and affect normal.         Behavior: Behavior normal.         Vital Signs  ED Triage Vitals   Temperature Pulse Respirations Blood Pressure SpO2   01/25/24 1352 01/25/24 1352 01/25/24 1352 01/25/24 1352 01/25/24 1352   98.7 °F (37.1 °C) 92 18 140/99 98 %      Temp Source Heart Rate Source Patient Position - Orthostatic VS BP Location FiO2 (%)   01/25/24 1352 01/25/24 1545 01/25/24 1352 01/25/24 1352 --   Temporal Monitor Sitting Left arm       Pain Score       01/25/24 1458       5           Vitals:    01/25/24 1352 01/25/24 1545 01/25/24 1600   BP: 140/99 125/79 122/60   Pulse: 92 77 71   Patient Position - Orthostatic VS: Sitting Lying          Visual Acuity      ED Medications  Medications   ondansetron (ZOFRAN) injection 4 mg (4 mg Intravenous Given 1/25/24 1458)   ketorolac (TORADOL) injection 15 mg (15 mg Intravenous Given 1/25/24 1458)    lactated ringers bolus 1,000 mL (1,000 mL Intravenous New Bag 1/25/24 1459)   iohexol (OMNIPAQUE) 350 MG/ML injection (MULTI-DOSE) 100 mL (100 mL Intravenous Given 1/25/24 1538)       Diagnostic Studies  Results Reviewed       Procedure Component Value Units Date/Time    Hepatic function panel [[id number]]  (Normal) Collected: 01/25/24 1456    Lab Status: Final result Specimen: Blood from Arm, Left Updated: 01/25/24 1531     Total Bilirubin 0.26 mg/dL      Bilirubin, Direct 0.04 mg/dL      Alkaline Phosphatase 54 U/L      AST 15 U/L      ALT 13 U/L      Total Protein 6.9 g/dL      Albumin 4.3 g/dL     Lipase [[id number]]  (Normal) Collected: 01/25/24 1456    Lab Status: Final result Specimen: Blood from Arm, Left Updated: 01/25/24 1531     Lipase 35 u/L     Basic metabolic panel [[id number]] Collected: 01/25/24 1456    Lab Status: Final result Specimen: Blood from Arm, Left Updated: 01/25/24 1531     Sodium 138 mmol/L      Potassium 4.0 mmol/L      Chloride 106 mmol/L      CO2 27 mmol/L      ANION GAP 5 mmol/L      BUN 12 mg/dL      Creatinine 0.79 mg/dL      Glucose 96 mg/dL      Calcium 9.3 mg/dL      eGFR 102 ml/min/1.73sq m     Narrative:      National Kidney Disease Foundation guidelines for Chronic Kidney Disease (CKD):     Stage 1 with normal or high GFR (GFR > 90 mL/min/1.73 square meters)    Stage 2 Mild CKD (GFR = 60-89 mL/min/1.73 square meters)    Stage 3A Moderate CKD (GFR = 45-59 mL/min/1.73 square meters)    Stage 3B Moderate CKD (GFR = 30-44 mL/min/1.73 square meters)    Stage 4 Severe CKD (GFR = 15-29 mL/min/1.73 square meters)    Stage 5 End Stage CKD (GFR <15 mL/min/1.73 square meters)  Note: GFR calculation is accurate only with a steady state creatinine    Urine Microscopic [[id number]] Collected: 01/25/24 1457    Lab Status: Final result Specimen: Urine, Clean Catch Updated: 01/25/24 1518     RBC, UA 1-2 /hpf      WBC, UA None Seen /hpf      Epithelial Cells Occasional /hpf      Bacteria, UA None  Seen /hpf      Amorphous Crystals, UA Occasional    UA w Reflex to Microscopic w Reflex to Culture [[id number]]  (Abnormal) Collected: 01/25/24 1457    Lab Status: Final result Specimen: Urine, Clean Catch Updated: 01/25/24 1515     Color, UA Yellow     Clarity, UA Turbid     Specific Gravity, UA 1.025     pH, UA 7.5     Leukocytes, UA Negative     Nitrite, UA Negative     Protein, UA Trace mg/dl      Glucose, UA Negative mg/dl      Ketones, UA Negative mg/dl      Urobilinogen, UA <2.0 mg/dl      Bilirubin, UA Negative     Occult Blood, UA Negative    CBC and differential [[id number]] Collected: 01/25/24 1456    Lab Status: Final result Specimen: Blood from Arm, Left Updated: 01/25/24 1513     WBC 8.78 Thousand/uL      RBC 4.20 Million/uL      Hemoglobin 13.3 g/dL      Hematocrit 38.9 %      MCV 93 fL      MCH 31.7 pg      MCHC 34.2 g/dL      RDW 11.7 %      MPV 11.2 fL      Platelets 228 Thousands/uL      nRBC 0 /100 WBCs      Neutrophils Relative 59 %      Immat GRANS % 0 %      Lymphocytes Relative 31 %      Monocytes Relative 8 %      Eosinophils Relative 1 %      Basophils Relative 1 %      Neutrophils Absolute 5.31 Thousands/µL      Immature Grans Absolute 0.02 Thousand/uL      Lymphocytes Absolute 2.69 Thousands/µL      Monocytes Absolute 0.66 Thousand/µL      Eosinophils Absolute 0.06 Thousand/µL      Basophils Absolute 0.04 Thousands/µL     POCT pregnancy, urine [[id number]]  (Normal) Resulted: 01/25/24 1455    Lab Status: Final result Updated: 01/25/24 1455     EXT Preg Test, Ur Negative     Control Valid                   US pelvis complete w transvaginal   Final Result by Tristan Villareal MD (01/25 1713)   Addendum (preliminary) 1 of 1 by Tristan Villareal MD (01/25 1713)   ADDENDUM:      For clarification, right and left ovarian Doppler flow is within normal    limits.      Final      5.2 x 4.5 x 4.6 cm mildly complex right ovarian cyst, likely hemorrhagic. O - RADS category: 2 -almost certainly benign. Follow-up  pelvic ultrasound is recommended in 8 to 12 weeks to ensure resolution.      The study was marked in EPIC for significant notification/follow-up.            Workstation performed: [id number]         CT abdomen pelvis with contrast   Final Result by Fabian Yepez MD (01/25 1608)      5.2 cm structure in the right adnexa measuring mildly above simple fluid density. Possible hemorrhagic cyst. Recommend further characterization with pelvic ultrasound.               The study was marked in EPIC for immediate notification.      Workstation performed: [id number]                    Procedures  Procedures         ED Course                                             Medical Decision Making  This is a 28-year-old female who presents here today for evaluation of abdominal pain.  She has a history of endometriosis and ovarian cysts, as well as IBS.  She says pain is somewhat similar to prior endometriosis flares and cysts, though more severe than usual and more diffuse.  It is usually fairly localized to the suprapubic region but now extends more to the sides bilaterally as well as to her upper abdomen.  She says she is currently on birth control for her endometriosis which has been helping control symptoms.  She is due for her menstrual period to start next week and says symptoms are usually worse than normal in the week preceding her period, though not usually this severe.  She did have an episode of diarrhea today which is not out of the norm because of her IBS.  She is having nausea but no vomiting.  She denies dysuria, hematuria, frequency, though says sometimes when she is urinating she feels like she is having bladder spasms.  She denies any irregular vaginal bleeding or discharge.  She had a laparoscopy for her endometriosis but no other abdominal surgeries.  She says she has had cyst rupture before but has never had any intervention or procedures for them.  She denies other medical problems.  She has been taking Advil  "complete with mild improvement of pain, as well as using a heating pad.  She feels like she is \"bloated recently.\"  She is having some chills but denies fevers.  She has no other URI symptoms.    Review of systems, otherwise negative unless stated as above    She is well-appearing, no acute distress.  She does have generalized abdominal tenderness, worse across the lower abdomen, without peritoneal signs.  Exam is otherwise unremarkable.  This could be a flare related to her endometriosis versus ovarian cyst, possible torsion or rupture related to this, versus IBS flare, colitis, enteritis, diverticulitis, UTI contributing to symptoms.  Will check lab work and CT scan to evaluate given more diffuse pain, and treat symptoms.  She declines anything sedating for pain due to work.    Urine shows no acute abnormalities.  Lab work is unremarkable.  CT scan shows large adnexal structure likely cyst.  Given size, we will get an ultrasound to confirm she does not have torsion though symptoms or not truly consistent with this.    She has a large cyst, with normal flow and no signs of torsion on ultrasound.  I discussed with her findings and continued treatment at home.  She is having some discomfort but says pain has improved.  She has had resolution of nausea.  She says in the past when she has ruptured cyst pain is severe for about 45 minutes before easing off.  I discussed with her changes in symptoms from prior pattern of cyst rupture should prompt return to the emergency department for evaluation of torsion as opposed to rupture, need for follow-up with gynecology, and she expresses understanding with this plan.    Problems Addressed:  Lower abdominal pain: acute illness or injury  Nausea: acute illness or injury  Right ovarian cyst: acute illness or injury    Amount and/or Complexity of Data Reviewed  External Data Reviewed: radiology and notes.  Labs: ordered. Decision-making details documented in ED " Course.  Radiology: ordered and independent interpretation performed. Decision-making details documented in ED Course.    Risk  Prescription drug management.             Disposition  Final diagnoses:   Right ovarian cyst   Lower abdominal pain   Nausea     Time reflects when diagnosis was documented in both MDM as applicable and the Disposition within this note       Time User Action Codes Description Comment    1/25/2024  5:23 PM Jennifer-Diane Andrew Add [N83.209] Ovarian cyst     1/25/2024  5:23 PM Diane Guardado Remove [N83.209] Ovarian cyst     1/25/2024  5:23 PM Jennifer-Micaela Andrewine Add [N83.201] Right ovarian cyst     1/25/2024  5:23 PM Jennifer-Diane Andrew Add [R10.30] Lower abdominal pain     1/25/2024  5:26 PM Jennifer-Micaela Andrewine Add [R11.0] Nausea           ED Disposition       ED Disposition   Discharge    Condition   Good    Date/Time   Thu Jan 25, 2024  5:22 PM    Comment   Cindi Rashid discharge to home/self care             Follow-up Information       Follow up With Specialties Details Why Contact Info Additional Information    [facility] Schedule an appointment as soon as possible for a visit in 1 week to follow up on your cyst [address] [facility] [id number] [phone] [facility], [facility] [address] [place], [place], [address] [phone]            Patient's Medications   Discharge Prescriptions    IBUPROFEN (MOTRIN) 600 MG TABLET    Take 1 tablet (600 mg total) by mouth every 6 (six) hours as needed for mild pain or moderate pain       Start Date: 1/25/2024 End Date: --       Order Dose: 600 mg       Quantity: 30 tablet    Refills: 0    ONDANSETRON (ZOFRAN-ODT) 4 MG DISINTEGRATING TABLET    Take 1 tablet (4 mg total) by mouth every 6 (six) hours as needed for nausea or vomiting        Start Date: 1/25/2024 End Date: --       Order Dose: 4 mg       Quantity: 20 tablet    Refills: 0       No discharge procedures on file.    PDMP Review         Value Time User    PDMP Reviewed  Yes 1/23/2024 12:14 PM Zoraida Rajan DO            ED Provider  Electronically Signed by             Diane Guardado MD  01/25/24 0882

## 2024-01-26 ENCOUNTER — TELEPHONE (OUTPATIENT)
Dept: OBGYN CLINIC | Facility: CLINIC | Age: 29
End: 2024-01-26

## 2024-01-26 DIAGNOSIS — N83.201 RIGHT OVARIAN CYST: Primary | ICD-10-CM

## 2024-01-26 NOTE — TELEPHONE ENCOUNTER
Reviewed note with patient- she is calling today for repeat scan for 8 wks- Dr in ER did review torsion with her.

## 2024-01-26 NOTE — TELEPHONE ENCOUNTER
Patient went for us yesterday and results came back that she has 5cm cyst on her ovary - wants to know next steps

## 2024-01-26 NOTE — TELEPHONE ENCOUNTER
Large cyst on ultrasound. Would recommend a f/u ultrasound in 8 weeks which I'll order for her now and office visit after repeat US if cyst is not resolved to discuss. Alternating tylenol and ibuprofen as needed for pain. ER precautions for sudden, severe RLQ or pelvic pain due to size and risk of torsion.

## 2024-02-23 DIAGNOSIS — F41.9 ANXIETY: ICD-10-CM

## 2024-02-26 RX ORDER — ALPRAZOLAM 0.5 MG/1
0.5 TABLET ORAL 3 TIMES DAILY PRN
Qty: 90 TABLET | Refills: 0 | Status: SHIPPED | OUTPATIENT
Start: 2024-02-26

## 2024-02-26 NOTE — TELEPHONE ENCOUNTER
Called patient. And was notified and stated she saw scotty for med review in [id number] does she nikolai another visit

## 2024-03-14 ENCOUNTER — APPOINTMENT (OUTPATIENT)
Age: 29
End: 2024-03-14
Payer: COMMERCIAL

## 2024-03-14 ENCOUNTER — OFFICE VISIT (OUTPATIENT)
Age: 29
End: 2024-03-14
Payer: COMMERCIAL

## 2024-03-14 VITALS
BODY MASS INDEX: 36.13 KG/M2 | SYSTOLIC BLOOD PRESSURE: 104 MMHG | RESPIRATION RATE: 18 BRPM | HEART RATE: 66 BPM | DIASTOLIC BLOOD PRESSURE: 64 MMHG | WEIGHT: 185 LBS | OXYGEN SATURATION: 98 % | TEMPERATURE: 97.8 F

## 2024-03-14 DIAGNOSIS — F32.A ANXIETY AND DEPRESSION: ICD-10-CM

## 2024-03-14 DIAGNOSIS — R30.0 DYSURIA: ICD-10-CM

## 2024-03-14 DIAGNOSIS — Z00.00 ANNUAL PHYSICAL EXAM: Primary | ICD-10-CM

## 2024-03-14 DIAGNOSIS — Z00.00 ANNUAL PHYSICAL EXAM: ICD-10-CM

## 2024-03-14 DIAGNOSIS — R53.81 MALAISE AND FATIGUE: Primary | ICD-10-CM

## 2024-03-14 DIAGNOSIS — R53.83 MALAISE AND FATIGUE: Primary | ICD-10-CM

## 2024-03-14 DIAGNOSIS — Z13.6 SCREENING FOR CARDIOVASCULAR CONDITION: ICD-10-CM

## 2024-03-14 DIAGNOSIS — F41.9 ANXIETY AND DEPRESSION: ICD-10-CM

## 2024-03-14 LAB
BACTERIA UR QL AUTO: ABNORMAL /HPF
BASOPHILS # BLD AUTO: 0.07 THOUSANDS/ÂΜL (ref 0–0.1)
BASOPHILS NFR BLD AUTO: 1 % (ref 0–1)
BILIRUB UR QL STRIP: NEGATIVE
CHOLEST SERPL-MCNC: 145 MG/DL
CLARITY UR: CLEAR
COLOR UR: ABNORMAL
EOSINOPHIL # BLD AUTO: 0.05 THOUSAND/ÂΜL (ref 0–0.61)
EOSINOPHIL NFR BLD AUTO: 1 % (ref 0–6)
ERYTHROCYTE [DISTWIDTH] IN BLOOD BY AUTOMATED COUNT: 11.6 % (ref 11.6–15.1)
GLUCOSE UR STRIP-MCNC: NEGATIVE MG/DL
HCT VFR BLD AUTO: 43.6 % (ref 34.8–46.1)
HDLC SERPL-MCNC: 49 MG/DL
HGB BLD-MCNC: 14.4 G/DL (ref 11.5–15.4)
HGB UR QL STRIP.AUTO: NEGATIVE
IMM GRANULOCYTES # BLD AUTO: 0.02 THOUSAND/UL (ref 0–0.2)
IMM GRANULOCYTES NFR BLD AUTO: 0 % (ref 0–2)
KETONES UR STRIP-MCNC: NEGATIVE MG/DL
LDLC SERPL CALC-MCNC: 82 MG/DL (ref 0–100)
LEUKOCYTE ESTERASE UR QL STRIP: ABNORMAL
LYMPHOCYTES # BLD AUTO: 1.83 THOUSANDS/ÂΜL (ref 0.6–4.47)
LYMPHOCYTES NFR BLD AUTO: 26 % (ref 14–44)
MCH RBC QN AUTO: 31.1 PG (ref 26.8–34.3)
MCHC RBC AUTO-ENTMCNC: 33 G/DL (ref 31.4–37.4)
MCV RBC AUTO: 94 FL (ref 82–98)
MONOCYTES # BLD AUTO: 0.51 THOUSAND/ÂΜL (ref 0.17–1.22)
MONOCYTES NFR BLD AUTO: 7 % (ref 4–12)
NEUTROPHILS # BLD AUTO: 4.62 THOUSANDS/ÂΜL (ref 1.85–7.62)
NEUTS SEG NFR BLD AUTO: 65 % (ref 43–75)
NITRITE UR QL STRIP: POSITIVE
NON-SQ EPI CELLS URNS QL MICRO: ABNORMAL /HPF
NRBC BLD AUTO-RTO: 0 /100 WBCS
PH UR STRIP.AUTO: 6.5 [PH]
PLATELET # BLD AUTO: 287 THOUSANDS/UL (ref 149–390)
PMV BLD AUTO: 12.2 FL (ref 8.9–12.7)
PROT UR STRIP-MCNC: NEGATIVE MG/DL
RBC # BLD AUTO: 4.63 MILLION/UL (ref 3.81–5.12)
RBC #/AREA URNS AUTO: ABNORMAL /HPF
SP GR UR STRIP.AUTO: 1.02 (ref 1–1.03)
TRIGL SERPL-MCNC: 68 MG/DL
TSH SERPL DL<=0.05 MIU/L-ACNC: 0.83 UIU/ML (ref 0.45–4.5)
UROBILINOGEN UR STRIP-ACNC: <2 MG/DL
WBC # BLD AUTO: 7.1 THOUSAND/UL (ref 4.31–10.16)
WBC #/AREA URNS AUTO: ABNORMAL /HPF

## 2024-03-14 PROCEDURE — 87086 URINE CULTURE/COLONY COUNT: CPT

## 2024-03-14 PROCEDURE — 36415 COLL VENOUS BLD VENIPUNCTURE: CPT

## 2024-03-14 PROCEDURE — 99395 PREV VISIT EST AGE 18-39: CPT | Performed by: FAMILY MEDICINE

## 2024-03-14 PROCEDURE — 81001 URINALYSIS AUTO W/SCOPE: CPT

## 2024-03-14 PROCEDURE — 85025 COMPLETE CBC W/AUTO DIFF WBC: CPT

## 2024-03-14 PROCEDURE — 84443 ASSAY THYROID STIM HORMONE: CPT

## 2024-03-14 PROCEDURE — 99214 OFFICE O/P EST MOD 30 MIN: CPT | Performed by: FAMILY MEDICINE

## 2024-03-14 PROCEDURE — 80061 LIPID PANEL: CPT

## 2024-03-14 RX ORDER — NITROFURANTOIN 25; 75 MG/1; MG/1
100 CAPSULE ORAL 2 TIMES DAILY
Qty: 10 CAPSULE | Refills: 0 | Status: SHIPPED | OUTPATIENT
Start: 2024-03-14 | End: 2024-03-19

## 2024-03-14 NOTE — PROGRESS NOTES
ADULT ANNUAL PHYSICAL  [facility] PRIMARY CARE [place]    NAME: Cindi Rashid  AGE: 28 y.o. SEX: female  : 1995     DATE: 3/14/2024     Assessment and Plan:     Problem List Items Addressed This Visit       Anxiety and depression-stable with daily Paxil and as needed Xanax.  Patient will establish with new therapist soon     Other Visit Diagnoses       Annual physical exam    -  Primary    Relevant Orders    TSH, 3rd generation with Free T4 reflex    Dysuria    -times a week.  Refractory to OTC analgesia.  No flank pain at fever.  Will empirically treat and wait for culture to verify sensitivities.    Relevant Medications    nitrofurantoin (MACROBID) 100 mg capsule    Other Relevant Orders    UA w Reflex to Microscopic w Reflex to Culture              Immunizations and preventive care screenings were discussed with patient today. Appropriate education was printed on patient's after visit summary.    Counseling:  Dental Health: discussed importance of regular tooth brushing, flossing, and dental visits.         Return in about 1 year (around 3/14/2025) for Annual physical.     Chief Complaint:     Chief Complaint   Patient presents with    Follow-up     Pt is here for medication check also she thinks she has a uti      History of Present Illness:     Adult Annual Physical   Patient here for a comprehensive physical exam.   Uti symtpoms x 1 week.   Planning a wedding   Lives with beatrice.   Works for [employer] office       Diet and Physical Activity  Diet/Nutrition: well balanced diet.   Exercise: 3-4 times a week on average.      Depression Screening  PHQ-2/9 Depression Screening    Little interest or pleasure in doing things: 0 - not at all  Feeling down, depressed, or hopeless: 0 - not at all  Trouble falling or staying asleep, or sleeping too much: 0 - not at all  Feeling tired or having little energy: 1 - several days  Poor appetite or overeatin - not at  all  Feeling bad about yourself - or that you are a failure or have let yourself or your family down: 0 - not at all  Trouble concentrating on things, such as reading the newspaper or watching television: 0 - not at all  Moving or speaking so slowly that other people could have noticed. Or the opposite - being so fidgety or restless that you have been moving around a lot more than usual: 0 - not at all  Thoughts that you would be better off dead, or of hurting yourself in some way: 0 - not at all  PHQ-9 Score: 1  PHQ-9 Interpretation: No or Minimal depression       General Health  Sleep: sleeps well.   Hearing: normal - bilateral.  Vision: wears contacts.   Dental: brushes teeth once daily.       /GYN Health  Follows with gynecology? yes   Last menstrual period: last month   Contraceptive method: oral contraceptives.  Last pap-2023          Review of Systems:     Review of Systems   Constitutional:  Negative for fever.   Respiratory:  Negative for shortness of breath.    Gastrointestinal:  Negative for constipation and diarrhea.   Genitourinary:  Positive for dysuria and urgency.   Psychiatric/Behavioral:  The patient is nervous/anxious.       Past Medical History:     Past Medical History:   Diagnosis Date    Endometriosis       Past Surgical History:     Past Surgical History:   Procedure Laterality Date    ENDOMETRIAL BIOPSY      LAPAROSCOPY  2020    NOSE SURGERY      TONSILLECTOMY        Social History:     Social History     Socioeconomic History    Marital status: Single     Spouse name: None    Number of children: None    Years of education: None    Highest education level: None   Occupational History    None   Tobacco Use    Smoking status: Never    Smokeless tobacco: Never    Tobacco comments:     Stopped vaping 1/11/2023!!   Vaping Use    Vaping status: Former    Substances: Nicotine   Substance and Sexual Activity    Alcohol use: Yes     Comment: social    Drug use: Never    Sexual activity: Yes      Partners: Male     Birth control/protection: OCP   Other Topics Concern    None   Social History Narrative    None     Social Determinants of Health     Financial Resource Strain: Not on file   Food Insecurity: Not on file   Transportation Needs: Not on file   Physical Activity: Not on file   Stress: Not on file   Social Connections: Not on file   Intimate Partner Violence: Not on file   Housing Stability: Not on file      Family History:     Family History   Problem Relation Age of Onset    Hypertension Father       Current Medications:     Current Outpatient Medications   Medication Sig Dispense Refill    nitrofurantoin (MACROBID) 100 mg capsule Take 1 capsule (100 mg total) by mouth 2 (two) times a day for 5 days 10 capsule 0    ALPRAZolam (XANAX) 0.5 mg tablet Take 1 tablet (0.5 mg total) by mouth 3 (three) times a day as needed for anxiety 90 tablet 0    clobetasol (TEMOVATE) 0.05 % cream Apply topically 2 (two) times a day 60 g 0    ibuprofen (MOTRIN) 600 mg tablet Take 1 tablet (600 mg total) by mouth every 6 (six) hours as needed for mild pain or moderate pain 30 tablet 0    norethindrone (Rukhsana) 0.35 MG tablet Take 1 tablet (0.35 mg total) by mouth daily 84 tablet 4    ondansetron (ZOFRAN-ODT) 4 mg disintegrating tablet Take 1 tablet (4 mg total) by mouth every 6 (six) hours as needed for nausea or vomiting 20 tablet 0    PARoxetine (PAXIL) 10 mg tablet TAKE 1 TABLET (10 MG TOTAL) BY MOUTH EVERY MORNING. 90 tablet 1     No current facility-administered medications for this visit.      Allergies:     Allergies   Allergen Reactions    Lactose - Food Allergy Abdominal Pain     Stomach pain no hives      Physical Exam:     /64 (BP Location: Left arm, Patient Position: Sitting, Cuff Size: Standard)   Pulse 66   Temp 97.8 °F (36.6 °C) (Temporal)   Resp 18   Wt 83.9 kg (185 lb)   SpO2 98%   BMI 36.13 kg/m²     Physical Exam  Vitals and nursing note reviewed.   Constitutional:       General: She is not  in acute distress.     Appearance: She is well-developed.   HENT:      Head: Normocephalic and atraumatic.      Right Ear: Tympanic membrane and external ear normal.      Left Ear: Tympanic membrane and external ear normal.      Mouth/Throat:      Mouth: Mucous membranes are moist.      Pharynx: Oropharynx is clear.   Eyes:      Extraocular Movements: Extraocular movements intact.      Conjunctiva/sclera: Conjunctivae normal.      Pupils: Pupils are equal, round, and reactive to light.   Cardiovascular:      Rate and Rhythm: Normal rate and regular rhythm.      Heart sounds: No murmur heard.  Pulmonary:      Effort: Pulmonary effort is normal. No respiratory distress.      Breath sounds: Normal breath sounds.   Abdominal:      General: Bowel sounds are normal.      Palpations: Abdomen is soft.      Tenderness: There is no abdominal tenderness.   Musculoskeletal:         General: No swelling.   Skin:     General: Skin is warm and dry.      Capillary Refill: Capillary refill takes less than 2 seconds.   Neurological:      Mental Status: She is alert and oriented to person, place, and time.   Psychiatric:         Mood and Affect: Mood normal.          Zoraida Rajan DO   [facility] PRIMARY CARE [place]

## 2024-03-15 ENCOUNTER — TELEPHONE (OUTPATIENT)
Age: 29
End: 2024-03-15

## 2024-03-15 NOTE — TELEPHONE ENCOUNTER
----- Message from Zoraida Rajan DO sent at 3/15/2024  9:02 AM EDT -----  Urine study does suggest an infection. She should finish the antibiotics given

## 2024-03-15 NOTE — TELEPHONE ENCOUNTER
----- Message from Rebeca Cody PA-C sent at 3/14/2024  7:56 PM EDT -----  Let pt know her lab work is normal.

## 2024-03-16 LAB — BACTERIA UR CULT: NORMAL

## 2024-03-20 ENCOUNTER — TELEPHONE (OUTPATIENT)
Age: 29
End: 2024-03-20

## 2024-03-20 DIAGNOSIS — R30.0 DYSURIA: Primary | ICD-10-CM

## 2024-03-20 NOTE — TELEPHONE ENCOUNTER
Since she had a full course of antibiotics I recommend further urinary testing.  For now she is to use over-the-counter Azo, Tylenol, and continue to drink plenty water to help with symptoms.  If she starts to get  back pain or fever she is to go to the emergency room

## 2024-03-20 NOTE — TELEPHONE ENCOUNTER
Saw Dr Rajan 3/14 for a UTI. She has finished her antibiotics and is still having symptoms of a UTI. What is her next step? Please call her back. [phone]

## 2024-03-22 ENCOUNTER — NURSE TRIAGE (OUTPATIENT)
Age: 29
End: 2024-03-22

## 2024-03-22 ENCOUNTER — OFFICE VISIT (OUTPATIENT)
Dept: OBGYN CLINIC | Facility: CLINIC | Age: 29
End: 2024-03-22
Payer: COMMERCIAL

## 2024-03-22 ENCOUNTER — TELEPHONE (OUTPATIENT)
Age: 29
End: 2024-03-22

## 2024-03-22 VITALS
BODY MASS INDEX: 27.49 KG/M2 | WEIGHT: 192 LBS | DIASTOLIC BLOOD PRESSURE: 70 MMHG | SYSTOLIC BLOOD PRESSURE: 120 MMHG | HEIGHT: 70 IN

## 2024-03-22 DIAGNOSIS — R39.9 UTI SYMPTOMS: Primary | ICD-10-CM

## 2024-03-22 DIAGNOSIS — B37.31 YEAST INFECTION INVOLVING THE VAGINA AND SURROUNDING AREA: ICD-10-CM

## 2024-03-22 LAB
BACTERIA UR QL AUTO: ABNORMAL /HPF
BILIRUB UR QL STRIP: ABNORMAL
BV WHIFF TEST VAG QL: NEGATIVE
CLARITY UR: ABNORMAL
CLUE CELLS SPEC QL WET PREP: NEGATIVE
COLOR UR: ABNORMAL
GLUCOSE UR STRIP-MCNC: NEGATIVE MG/DL
HGB UR QL STRIP.AUTO: ABNORMAL
KETONES UR STRIP-MCNC: NEGATIVE MG/DL
LEUKOCYTE ESTERASE UR QL STRIP: NEGATIVE
MUCOUS THREADS UR QL AUTO: ABNORMAL
NITRITE UR QL STRIP: POSITIVE
NON-SQ EPI CELLS URNS QL MICRO: ABNORMAL /HPF
PH SMN: 4 [PH]
PH UR STRIP.AUTO: 5.5 [PH]
PROT UR STRIP-MCNC: ABNORMAL MG/DL
RBC #/AREA URNS AUTO: ABNORMAL /HPF
SP GR UR STRIP.AUTO: 1.03 (ref 1–1.03)
T VAGINALIS VAG QL WET PREP: NEGATIVE
UROBILINOGEN UR STRIP-ACNC: 6 MG/DL
WBC #/AREA URNS AUTO: ABNORMAL /HPF
YEAST VAG QL WET PREP: POSITIVE

## 2024-03-22 PROCEDURE — 87086 URINE CULTURE/COLONY COUNT: CPT

## 2024-03-22 PROCEDURE — 99213 OFFICE O/P EST LOW 20 MIN: CPT

## 2024-03-22 PROCEDURE — 81001 URINALYSIS AUTO W/SCOPE: CPT

## 2024-03-22 PROCEDURE — 87210 SMEAR WET MOUNT SALINE/INK: CPT

## 2024-03-22 RX ORDER — FLUCONAZOLE 150 MG/1
150 TABLET ORAL ONCE
Qty: 2 TABLET | Refills: 0 | Status: SHIPPED | OUTPATIENT
Start: 2024-03-22 | End: 2024-03-22

## 2024-03-22 NOTE — TELEPHONE ENCOUNTER
Patient was seen by primary care physician 3/19 and given   Macrobid for urinary tract infection. Patient finished the 5 days of medication and is still in misery. Called her primary care physician who told   her she could do nothing else for  her and to go to emergency room.  She is a patient of raul's  and had an ultrasound scheduled 3/26.  Offered appointment today at 1:15 with faina, but unable to keep.   States she will go to urgent care as she is the  and unable to leave before 5 pm

## 2024-03-22 NOTE — PROGRESS NOTES
Assessment/Plan:    No problem-specific Assessment & Plan notes found for this encounter.    -[place] on wet mount. Perineal hygiene reviewed. Repeat UA/UC pending.   -call if symptoms worsen. Return for annual.      Diagnoses and all orders for this visit:    UTI symptoms  -     Urine culture  -     UA w Reflex to Microscopic w Reflex to Culture    Yeast infection involving the vagina and surrounding area  -     fluconazole (DIFLUCAN) 150 mg tablet; Take 1 tablet (150 mg total) by mouth once for 1 dose May repeat dose in 3 days  -     POCT wet mount          Subjective:      Patient ID: Cindi Rashid is a 28 y.o. female here for UTI symptoms. Patient was treated for a UTI by her PCP 2 weeks ago. Reports finishing her antibiotic course but still having symptoms. She reports continued dysuria, frequency, and urgency. Today she started having vaginal itching and clumpy white discharge. She has been taking azo to help with her symptoms. Sexually active, denies dyspareunia. Reports hematuria that resolved after she finished the antibiotics.   Patient's last menstrual period was 02/27/2024.      The following portions of the patient's history were reviewed and updated as appropriate: She  has a past medical history of Endometriosis.  She   Patient Active Problem List    Diagnosis Date Noted    Eczema 10/11/2023    Anxiety and depression 10/11/2023    Encounter for surveillance of contraceptive pills 04/19/2023    Endometriosis 04/15/2019     She  has a past surgical history that includes Endometrial biopsy; Nose surgery; Tonsillectomy; and LAPAROSCOPY (2020).  Her family history includes Hypertension in her father.  She  reports that she has never smoked. She has never used smokeless tobacco. She reports current alcohol use. She reports that she does not use drugs.  Current Outpatient Medications   Medication Sig Dispense Refill    ALPRAZolam (XANAX) 0.5 mg tablet Take 1 tablet (0.5 mg total) by mouth 3 (three) times a day  "as needed for anxiety 90 tablet 0    clobetasol (TEMOVATE) 0.05 % cream Apply topically 2 (two) times a day 60 g 0    fluconazole (DIFLUCAN) 150 mg tablet Take 1 tablet (150 mg total) by mouth once for 1 dose May repeat dose in 3 days 2 tablet 0    ibuprofen (MOTRIN) 600 mg tablet Take 1 tablet (600 mg total) by mouth every 6 (six) hours as needed for mild pain or moderate pain 30 tablet 0    norethindrone (Rukhsana) 0.35 MG tablet Take 1 tablet (0.35 mg total) by mouth daily 84 tablet 4    ondansetron (ZOFRAN-ODT) 4 mg disintegrating tablet Take 1 tablet (4 mg total) by mouth every 6 (six) hours as needed for nausea or vomiting 20 tablet 0    PARoxetine (PAXIL) 10 mg tablet TAKE 1 TABLET (10 MG TOTAL) BY MOUTH EVERY MORNING. 90 tablet 1     No current facility-administered medications for this visit.     She is allergic to lactose - food allergy..    Review of Systems   Constitutional:  Negative for chills and fever.   Gastrointestinal:  Negative for abdominal pain.   Genitourinary:  Positive for dysuria, frequency, hematuria, pelvic pain, urgency and vaginal discharge. Negative for difficulty urinating, dyspareunia, flank pain, genital sores, menstrual problem, vaginal bleeding and vaginal pain.   Musculoskeletal:  Negative for back pain and myalgias.   Skin:  Negative for rash.   All other systems reviewed and are negative.        Objective:      /70 (BP Location: Left arm, Patient Position: Sitting)   Ht 5' 10\" (1.778 m)   Wt 87.1 kg (192 lb)   LMP 02/27/2024   BMI 27.55 kg/m²          Physical Exam  Vitals and nursing note reviewed.   Constitutional:       General: She is not in acute distress.     Appearance: Normal appearance. She is not ill-appearing.   HENT:      Head: Normocephalic and atraumatic.   Eyes:      Conjunctiva/sclera: Conjunctivae normal.   Pulmonary:      Effort: Pulmonary effort is normal.   Abdominal:      Palpations: Abdomen is soft.      Tenderness: There is abdominal tenderness in " the suprapubic area. There is no right CVA tenderness or left CVA tenderness.   Genitourinary:     General: Normal vulva.      Exam position: Lithotomy position.      Labia:         Right: No rash, tenderness or lesion.         Left: No rash, tenderness or lesion.       Vagina: No signs of injury and foreign body. Vaginal discharge present. No erythema, tenderness, bleeding, lesions or prolapsed vaginal walls.      Cervix: No cervical motion tenderness, discharge, friability, lesion or cervical bleeding.   Musculoskeletal:         General: Normal range of motion.      Cervical back: Neck supple.   Lymphadenopathy:      Lower Body: No right inguinal adenopathy. No left inguinal adenopathy.   Skin:     General: Skin is warm and dry.   Neurological:      General: No focal deficit present.      Mental Status: She is alert.   Psychiatric:         Mood and Affect: Mood normal.         Behavior: Behavior normal.

## 2024-03-22 NOTE — PATIENT INSTRUCTIONS
Yeast Infection   AMBULATORY CARE:   A yeast infection , or vaginal candidiasis, is a common vaginal infection. A yeast infection is caused by a fungus, or yeast-like germ. Fungi are normally found in your vagina. Too many fungi can cause an infection.  Common signs and symptoms:   Thick, white, cheese-like discharge from your vagina    Itching, swelling, and redness in your vagina    Pain or burning when you urinate    Pain during sexual intercourse    Call your doctor or gynecologist if:   You have a fever and chills.    You develop abdominal or pelvic pain.    Your discharge is bloody and it is not your monthly period.    Your signs and symptoms get worse, even after treatment.    You have questions or concerns about your condition or care.    Treatment for a yeast infection  includes medicines to treat the fungal infection and decrease inflammation. The medicine may be a pill, cream, ointment, or vaginal tablet or suppository.  Keep your vagina healthy:   Clean your genital area with mild soap and warm water each day.  Do not get soap inside your vagina. Gently dry the area after washing. Do not use hot tubs. The heat and moisture from hot tubs can increase your risk for another yeast infection.    Always wipe from front to back  after you use the toilet. This prevents spreading bacteria from your rectal area into your vagina.    Do not wear tight-fitting clothes or undergarments  for long periods of time. Wear cotton underwear during the day. Cotton helps keep your genital area dry and does not hold in warmth or moisture. Do not wear underwear at night.    Do not douche  or use feminine hygiene sprays or bubble bath. Do not use pads or tampons that are scented, or colored or perfumed toilet paper.    Do not have sex until your symptoms go away.  Have your partner wear a condom until you complete your course of medication.    Ask your healthcare provider about birth control options if necessary.  Condoms have  latex and diaphragms have gel that kills sperm. Both of these may irritate your genital area.    Follow up with your doctor or gynecologist as directed:  Write down your questions so you remember to ask them during your visits.  © Copyright [employer] 2023 Information is for End User's use only and may not be sold, redistributed or otherwise used for commercial purposes.  The above information is an  only. It is not intended as medical advice for individual conditions or treatments. Talk to your doctor, nurse or pharmacist before following any medical regimen to see if it is safe and effective for you.

## 2024-03-22 NOTE — TELEPHONE ENCOUNTER
Regarding: possible uti  ----- Message from Sabrina Master sent at 3/22/2024 11:35 AM EDT -----  Patient was seen by pcp last week for uti and given 5 days of antibiotics. She said its worse now, she called pcp they said they can't do anything else go to ed if it worsens. Please advise.

## 2024-03-24 LAB
BACTERIA UR CULT: ABNORMAL
BACTERIA UR CULT: ABNORMAL

## 2024-03-25 DIAGNOSIS — N39.0 URINARY TRACT INFECTION WITHOUT HEMATURIA, SITE UNSPECIFIED: Primary | ICD-10-CM

## 2024-03-25 DIAGNOSIS — F41.9 ANXIETY: ICD-10-CM

## 2024-03-25 RX ORDER — SULFAMETHOXAZOLE AND TRIMETHOPRIM 800; 160 MG/1; MG/1
1 TABLET ORAL EVERY 12 HOURS SCHEDULED
Qty: 6 TABLET | Refills: 0 | Status: SHIPPED | OUTPATIENT
Start: 2024-03-25 | End: 2024-03-28

## 2024-03-26 ENCOUNTER — HOSPITAL ENCOUNTER (OUTPATIENT)
Dept: ULTRASOUND IMAGING | Facility: HOSPITAL | Age: 29
Discharge: HOME | End: 2024-03-26
Payer: COMMERCIAL

## 2024-03-26 DIAGNOSIS — N83.201 RIGHT OVARIAN CYST: ICD-10-CM

## 2024-03-26 PROCEDURE — 76830 TRANSVAGINAL US NON-OB: CPT

## 2024-03-26 PROCEDURE — 76856 US EXAM PELVIC COMPLETE: CPT

## 2024-03-27 ENCOUNTER — TELEPHONE (OUTPATIENT)
Age: 29
End: 2024-03-27

## 2024-03-27 DIAGNOSIS — F41.9 ANXIETY: ICD-10-CM

## 2024-03-27 RX ORDER — ALPRAZOLAM 0.5 MG/1
0.5 TABLET ORAL 3 TIMES DAILY PRN
Qty: 90 TABLET | Refills: 0 | Status: CANCELLED | OUTPATIENT
Start: 2024-03-27

## 2024-03-28 DIAGNOSIS — F41.9 ANXIETY: ICD-10-CM

## 2024-03-28 RX ORDER — ALPRAZOLAM 0.5 MG/1
0.5 TABLET ORAL 3 TIMES DAILY PRN
Qty: 90 TABLET | Refills: 0 | Status: SHIPPED | OUTPATIENT
Start: 2024-03-28

## 2024-03-28 RX ORDER — ALPRAZOLAM 0.5 MG/1
0.5 TABLET ORAL 3 TIMES DAILY PRN
Qty: 90 TABLET | Refills: 0 | OUTPATIENT
Start: 2024-03-28

## 2024-04-19 ENCOUNTER — ANNUAL EXAM (OUTPATIENT)
Dept: OBGYN CLINIC | Facility: CLINIC | Age: 29
End: 2024-04-19
Payer: COMMERCIAL

## 2024-04-19 VITALS
DIASTOLIC BLOOD PRESSURE: 70 MMHG | HEIGHT: 70 IN | WEIGHT: 192.4 LBS | BODY MASS INDEX: 27.54 KG/M2 | SYSTOLIC BLOOD PRESSURE: 112 MMHG

## 2024-04-19 DIAGNOSIS — N63.21 MASS OF UPPER OUTER QUADRANT OF LEFT BREAST: ICD-10-CM

## 2024-04-19 DIAGNOSIS — N80.9 ENDOMETRIOSIS: ICD-10-CM

## 2024-04-19 DIAGNOSIS — Z01.419 ENCOUNTER FOR ANNUAL ROUTINE GYNECOLOGICAL EXAMINATION: Primary | ICD-10-CM

## 2024-04-19 PROCEDURE — 99395 PREV VISIT EST AGE 18-39: CPT

## 2024-04-19 RX ORDER — ACETAMINOPHEN AND CODEINE PHOSPHATE 120; 12 MG/5ML; MG/5ML
1 SOLUTION ORAL DAILY
Qty: 84 TABLET | Refills: 4 | Status: SHIPPED | OUTPATIENT
Start: 2024-04-19

## 2024-04-19 NOTE — PROGRESS NOTES
Assessment/Plan:    Encounter for annual routine gynecological examination  Last pap , due . Declined STD screening.   Happy with current POPs. Refills sent.   S/p Gardasil series.   Semi firm breast lump noted on upper outer left breast. US pended. Self breast awareness encouraged.   Healthy lifestyle encouraged.  F/u annually and PRN       Diagnoses and all orders for this visit:    Encounter for annual routine gynecological examination    Endometriosis  -     norethindrone (Rukhsana) 0.35 MG tablet; Take 1 tablet (0.35 mg total) by mouth daily    Mass of upper outer quadrant of left breast  -     US breast left limited (diagnostic); Future          Health Maintenance:    Last PAP: 2023. Neg/neg    Gardasil Vaccine Series: She has had the Gardasil series.    Subjective    CC: Yearly Exam     Cindi Rashid is a 28 y.o. female  here for an annual exam.      Menarche: 13 Y/O    Patient's last menstrual period was 2024 (exact date).  Sexual activity: She is sexually active   Contraception: POPs  STD testing:  She does not want STD testing today.   non smoker, non drinker    Here for annual exam. She has no questions or concerns for today's visit.   Her menstrual cycles are regular every 28-30 days. She denies any issues with bleeding or her menses. She denies breast concerns, abnormal vaginal discharge, vaginal itching, odor, irritation, bowel/bladder dysfunction, urinary symptoms, pelvic pain, or dyspareunia today.     Family hx of breast cancer: denies  Family hx of ovarian cancer: denies   Family hx of colon cancer: denies     Past Medical History:   Diagnosis Date    Endometriosis      Past Surgical History:   Procedure Laterality Date    ENDOMETRIAL BIOPSY      LAPAROSCOPY      NOSE SURGERY      TONSILLECTOMY         Immunization History   Administered Date(s) Administered    COVID-19 PFIZER VACCINE 0.3 ML IM 2022    COVID-19 Pfizer Vac BIVALENT Daniel-sucrose 12 Yr+ IM  "10/29/2022       Family History   Problem Relation Age of Onset    Hypertension Father      Social History     Tobacco Use    Smoking status: Never    Smokeless tobacco: Never    Tobacco comments:     Stopped vaping 2023!!   Vaping Use    Vaping status: Former    Substances: Nicotine   Substance Use Topics    Alcohol use: Yes     Comment: social    Drug use: Never       Current Outpatient Medications:     ALPRAZolam (XANAX) 0.5 mg tablet, Take 1 tablet (0.5 mg total) by mouth 3 (three) times a day as needed for anxiety, Disp: 90 tablet, Rfl: 0    clobetasol (TEMOVATE) 0.05 % cream, Apply topically 2 (two) times a day, Disp: 60 g, Rfl: 0    ibuprofen (MOTRIN) 600 mg tablet, Take 1 tablet (600 mg total) by mouth every 6 (six) hours as needed for mild pain or moderate pain, Disp: 30 tablet, Rfl: 0    norethindrone (Rukhsana) 0.35 MG tablet, Take 1 tablet (0.35 mg total) by mouth daily, Disp: 84 tablet, Rfl: 4    ondansetron (ZOFRAN-ODT) 4 mg disintegrating tablet, Take 1 tablet (4 mg total) by mouth every 6 (six) hours as needed for nausea or vomiting, Disp: 20 tablet, Rfl: 0    PARoxetine (PAXIL) 10 mg tablet, TAKE 1 TABLET (10 MG TOTAL) BY MOUTH EVERY MORNING., Disp: 90 tablet, Rfl: 1  Patient Active Problem List    Diagnosis Date Noted    Encounter for annual routine gynecological examination 2024    Eczema 10/11/2023    Anxiety and depression 10/11/2023    Encounter for surveillance of contraceptive pills 2023    Endometriosis 04/15/2019       Allergies   Allergen Reactions    Lactose - Food Allergy Abdominal Pain     Stomach pain no hives       OB History    Para Term  AB Living   0 0 0 0 0 0   SAB IAB Ectopic Multiple Live Births   0 0 0 0 0       Vitals:    24 1532   BP: 112/70   BP Location: Left arm   Patient Position: Sitting   Weight: 87.3 kg (192 lb 6.4 oz)   Height: 5' 10\" (1.778 m)     Body mass index is 27.61 kg/m².    Review of Systems   Constitutional:  Negative for " chills and fever.   Gastrointestinal:  Negative for abdominal pain, constipation, diarrhea, nausea and vomiting.   Genitourinary:  Negative for difficulty urinating, dyspareunia, dysuria, frequency, menstrual problem, pelvic pain, urgency, vaginal bleeding, vaginal discharge and vaginal pain.   Musculoskeletal:  Negative for back pain and myalgias.   Skin:  Negative for pallor and rash.   Neurological:  Negative for headaches.   Hematological:  Negative for adenopathy.   Psychiatric/Behavioral:  Negative for dysphoric mood.         Physical Exam  Constitutional:       General: She is not in acute distress.     Appearance: Normal appearance. She is not ill-appearing.   Genitourinary:      No lesions in the vagina.      Right Labia: No rash, tenderness, lesions or skin changes.     Left Labia: No tenderness, lesions, skin changes or rash.     No inguinal adenopathy present in the right or left side.     No vaginal discharge, erythema, tenderness, bleeding or ulceration.        Right Adnexa: not tender, not full and no mass present.     Left Adnexa: not tender, not full and no mass present.     Cervix is nulliparous.      No cervical motion tenderness, discharge, friability, lesion, polyp or nabothian cyst.      Uterus is not enlarged, fixed or tender.      No uterine mass detected.     No urethral prolapse, tenderness or discharge present.      Bladder is not tender and urgency on palpation not present.       Pelvic exam was performed with patient in the lithotomy position.   Breasts:     Right: No swelling, inverted nipple, mass, nipple discharge, skin change or tenderness.      Left: Mass present. No swelling, inverted nipple, nipple discharge, skin change or tenderness.   HENT:      Head: Normocephalic and atraumatic.   Eyes:      Conjunctiva/sclera: Conjunctivae normal.   Pulmonary:      Effort: Pulmonary effort is normal.   Chest:       Abdominal:      General: There is no distension.      Palpations: Abdomen is  soft.      Tenderness: There is no abdominal tenderness.   Musculoskeletal:         General: Normal range of motion.      Cervical back: Neck supple.   Lymphadenopathy:      Upper Body:      Right upper body: No supraclavicular or axillary adenopathy.      Left upper body: No supraclavicular or axillary adenopathy.      Lower Body: No right inguinal adenopathy. No left inguinal adenopathy.   Neurological:      Mental Status: She is alert and oriented to person, place, and time.   Skin:     General: Skin is warm and dry.   Psychiatric:         Mood and Affect: Mood normal.         Behavior: Behavior normal.         Thought Content: Thought content normal.         Judgment: Judgment normal.   Vitals and nursing note reviewed.

## 2024-04-19 NOTE — ASSESSMENT & PLAN NOTE
Last pap 2023, due 2026. Declined STD screening.   Happy with current POPs. Refills sent.   S/p Gardasil series.   Semi firm breast lump noted on upper outer left breast. US pended. Self breast awareness encouraged.   Healthy lifestyle encouraged.  F/u annually and PRN

## 2024-04-29 DIAGNOSIS — F41.9 ANXIETY: ICD-10-CM

## 2024-04-29 RX ORDER — ALPRAZOLAM 0.5 MG/1
0.5 TABLET ORAL 3 TIMES DAILY PRN
Qty: 90 TABLET | Refills: 0 | Status: SHIPPED | OUTPATIENT
Start: 2024-04-29

## 2024-04-30 ENCOUNTER — HOSPITAL ENCOUNTER (OUTPATIENT)
Dept: ULTRASOUND IMAGING | Facility: CLINIC | Age: 29
Discharge: HOME | End: 2024-04-30
Payer: COMMERCIAL

## 2024-04-30 DIAGNOSIS — N63.21 MASS OF UPPER OUTER QUADRANT OF LEFT BREAST: ICD-10-CM

## 2024-04-30 PROCEDURE — 76642 ULTRASOUND BREAST LIMITED: CPT

## 2024-05-19 PROBLEM — Z01.419 ENCOUNTER FOR ANNUAL ROUTINE GYNECOLOGICAL EXAMINATION: Status: RESOLVED | Noted: 2024-04-19 | Resolved: 2024-05-19

## 2024-05-21 DIAGNOSIS — F90.9 ATTENTION DEFICIT HYPERACTIVITY DISORDER (ADHD), UNSPECIFIED ADHD TYPE: ICD-10-CM

## 2024-05-22 RX ORDER — PAROXETINE 10 MG/1
10 TABLET, FILM COATED ORAL EVERY MORNING
Qty: 90 TABLET | Refills: 1 | Status: SHIPPED | OUTPATIENT
Start: 2024-05-22

## 2024-05-28 DIAGNOSIS — F41.9 ANXIETY: ICD-10-CM

## 2024-05-28 NOTE — TELEPHONE ENCOUNTER
Medication: Alprazolam     Dose/Frequency: 0.5 mg tab     Quantity: 90    Pharmacy: [id number]    Office:   [x] PCP/Provider - Dr. Rajan   [] Speciality/Provider -     Does the patient have enough for 3 days?   [] Yes   [x] No - Send as HP to POD

## 2024-05-30 RX ORDER — ALPRAZOLAM 0.5 MG/1
0.5 TABLET ORAL 3 TIMES DAILY PRN
Qty: 90 TABLET | Refills: 0 | Status: SHIPPED | OUTPATIENT
Start: 2024-05-30

## 2024-07-02 DIAGNOSIS — F41.9 ANXIETY: ICD-10-CM

## 2024-07-02 NOTE — TELEPHONE ENCOUNTER
Medication: Xanax    Dose/Frequency: 0.5mg; one tablet orally three times daily as needed for anxiety    Quantity: 90 tablets    Pharmacy: [id number]  [address]  [place]  [phone]    Office:   [x] PCP/Provider - Dr Rajan  [] Speciality/Provider -     Does the patient have enough for 3 days?   [] Yes   [x] No - Send as HP to POD     Please call patient to notify her once sent to pharmacy.

## 2024-07-03 RX ORDER — ALPRAZOLAM 0.5 MG/1
0.5 TABLET ORAL 3 TIMES DAILY PRN
Qty: 90 TABLET | Refills: 0 | Status: SHIPPED | OUTPATIENT
Start: 2024-07-03

## 2024-07-30 DIAGNOSIS — F41.9 ANXIETY: ICD-10-CM

## 2024-07-31 NOTE — TELEPHONE ENCOUNTER
Patient called to follow up on refill request as she will be leaving a 2 week backpacking trip tomorrow, 8/1/24.    Refill needs to be submitted today, 7/31/24.

## 2024-08-01 RX ORDER — ALPRAZOLAM 0.5 MG/1
0.5 TABLET ORAL 3 TIMES DAILY PRN
Qty: 90 TABLET | Refills: 0 | Status: SHIPPED | OUTPATIENT
Start: 2024-08-01

## 2024-09-03 DIAGNOSIS — F41.9 ANXIETY: ICD-10-CM

## 2024-09-04 RX ORDER — ALPRAZOLAM 0.5 MG
0.5 TABLET ORAL 3 TIMES DAILY PRN
Qty: 90 TABLET | Refills: 0 | Status: SHIPPED | OUTPATIENT
Start: 2024-09-04

## 2024-10-02 ENCOUNTER — NURSE TRIAGE (OUTPATIENT)
Age: 29
End: 2024-10-02

## 2024-10-02 NOTE — TELEPHONE ENCOUNTER
"Outgoing call to patient. Patient reports she has endometriosis and believes she may have another ovarian cyst. Last week had the worst period she has ever had, was soaking a tampon in 1 hour and having intense pain. States it was to the point that she tried not to cry. Pain is on the lower right pelvic area near her belly button. Right now 2/10 however at its worse was 8/10. Has tried tylenol, ibuprofen and dual action. Also has been using a heating pad. LMP was 09/23, has a history of abnormal menses. No chance of pregnancy per patient. Patient states she has had this before, and has had cysts rupture. Attempted to find an appointment for patient but unable to schedule within a 2 week time frame. Will send to office for appointment scheduling. Home care advice given. Advised if pain is worsening, patient should be seen in the emergency department.    Reason for Disposition   Pelvic pain is a chronic symptom (recurrent or ongoing and present > 4 weeks)    Answer Assessment - Initial Assessment Questions  1. LOCATION: \"Where does it hurt?\"       Lower right area by belly button  2. RADIATION: \"Does the pain shoot anywhere else?\" (e.g., lower back, groin, thighs)      Sometimes cramping gets to her back, tightening in the groin area   3. ONSET: \"When did the pain begin?\" (e.g., minutes, hours or days ago)       Last week with menses   4. SUDDEN: \"Gradual or sudden onset?\"      Gradual  5. PATTERN \"Does the pain come and go, or is it constant?\"     - If constant: \"Is it getting better, staying the same, or worsening?\"       (Note: Constant means the pain never goes away completely; most serious pain is constant and gets worse over time)      - If intermittent: \"How long does it last?\" \"Do you have pain now?\"      (Note: Intermittent means the pain goes away completely between bouts)      Comes and goes, right now feels it but isn't bad- other times is stabbing and pretty bad   6. SEVERITY: \"How bad is the pain?\"  " "(e.g., Scale 1-10; mild, moderate, or severe)    - MILD (1-3): doesn't interfere with normal activities, area soft and not tender to touch     - MODERATE (4-7): interferes with normal activities or awakens from sleep, tender to touch     - SEVERE (8-10): excruciating pain, doubled over, unable to do any normal activities       Last night 8/10, 2/10  7. RECURRENT SYMPTOM: \"Have you ever had this type of pelvic pain before?\" If Yes, ask: \"When was the last time?\" and \"What happened that time?\"       Yes, has endometriosis, has had ovarian cysts in the past   8. CAUSE: \"What do you think is causing the pelvic pain?\"      Another cyst, had a big one earlier this year- has had large cyst ruptures in the past  9. RELIEVING/AGGRAVATING FACTORS: \"What makes it better or worse?\" (e.g., activity/rest, sexual intercourse, voiding, passing stool)      Better at rest, heating pad and pressure   10. OTHER SYMPTOMS: \"Has there been any other symptoms?\" (e.g., fever, vaginal bleeding, vaginal discharge, diarrhea, constipation, or voiding problems?\"        Also has IBS, unsure if this worsens   11. PREGNANCY: \"Is there any chance you are pregnant?\" \"When was your last menstrual period?\"        Denies, LMP 09/23    Protocols used: Pelvic Pain - Female-ADULT-[place]    "

## 2024-10-02 NOTE — TELEPHONE ENCOUNTER
Regarding: cyst vs pcos flare up  ----- Message from Sabrina SPEARS sent at 10/2/2024  8:49 AM EDT -----  Patient has possible cyst or pcos flare up

## 2024-10-03 NOTE — TELEPHONE ENCOUNTER
Scheduled Ms. Rashid for an office visit with ALBERTO Honeycutt on Tuesday, October 15, 2024 @12:00pm.

## 2024-10-04 DIAGNOSIS — F41.9 ANXIETY: ICD-10-CM

## 2024-10-04 NOTE — TELEPHONE ENCOUNTER
Pt called in requesting medication refill of the following medication:    Requested Prescriptions     Pending Prescriptions Disp Refills    ALPRAZolam (XANAX) 0.5 mg tablet 90 tablet 0     Sig: Take 1 tablet (0.5 mg total) by mouth 3 (three) times a day as needed for anxiety        Script going to the following pharmacy:  [facility] #[id number] - [place] - [address] [phone]

## 2024-10-06 RX ORDER — ALPRAZOLAM 0.5 MG
0.5 TABLET ORAL 3 TIMES DAILY PRN
Qty: 90 TABLET | Refills: 0 | Status: SHIPPED | OUTPATIENT
Start: 2024-10-06

## 2024-10-10 NOTE — PROGRESS NOTES
Diagnoses and all orders for this visit:    Endometriosis  -     US pelvis complete w transvaginal; Future    Pelvic pain  -     US pelvis complete w transvaginal; Future    We discussed Depo-Provera every 3 months    Information provided for patient on Depo and IUD she will review at home and make a decision.    Advised to schedule for pelvic ultrasound reviewed NSAID prophylactic therapy.  If patient does decide on IUD and is unable to get on the schedule in a relatively acceptable timeframe we may bridge with Depo    We have reviewed mechanism of action, benefits, risks, side effects of LARC's & IUD placement.  Advised to schedule for IUD insertion.  Patient has been advised to avoid intercourse for 2 weeks prior to insertion,  patient has been advised to take 600 mg of ibuprofen with food 1 hour prior to the appointment to help diminish cramping post placement.     See after visit summary for further information and recommendations to the above mentioned subjects which we may or may not have covered in detail during your visit     Subjective    CC: Problem visit     Cindi Rashid is a 28 y.o. female   Mentions pelvic pain, hx of endometriosis, she is taking POP's   Pelvic Pain has been increasing over the last several months, she mentons that her menstrual cycles are getting heavier over the past 4 months , changes a super plus tampons every 1-2 hours for 4-5 days   She is anemic, last month she felt like she was going to pass out during her menstrual cycle which is affecting her ability to do her job correctly     She has hx of GABRIEL use  but bleeding got worse, swithched to POP's which were helping much better, she had a Laparoscopy in 2019 which was helpful , she feels she is back to where she was verfor the lap     SA with 1 partner no concerns for STD's    Patient's last menstrual period was 2024 (exact date).    Past Medical History:   Diagnosis Date    Endometriosis      Past Surgical  History:   Procedure Laterality Date    ENDOMETRIAL BIOPSY      LAPAROSCOPY  2020    NOSE SURGERY      TONSILLECTOMY         Immunization History   Administered Date(s) Administered    COVID-19 PFIZER VACCINE 0.3 ML IM 01/14/2022    COVID-19 Pfizer Vac BIVALENT Daniel-sucrose 12 Yr+ IM 10/29/2022       Family History   Problem Relation Age of Onset    No Known Problems Mother     Hypertension Father     No Known Problems Sister     No Known Problems Sister     Skin cancer Maternal Grandmother     No Known Problems Maternal Grandfather     Breast cancer Paternal Grandmother 50    No Known Problems Maternal Aunt     No Known Problems Paternal Aunt      Social History     Tobacco Use    Smoking status: Never    Smokeless tobacco: Never    Tobacco comments:     Stopped vaping 1/11/2023!!   Vaping Use    Vaping status: Former    Substances: Nicotine   Substance Use Topics    Alcohol use: Yes     Comment: social    Drug use: Never       Current Outpatient Medications:     ALPRAZolam (XANAX) 0.5 mg tablet, Take 1 tablet (0.5 mg total) by mouth 3 (three) times a day as needed for anxiety, Disp: 90 tablet, Rfl: 0    clobetasol (TEMOVATE) 0.05 % cream, Apply topically 2 (two) times a day, Disp: 60 g, Rfl: 0    ibuprofen (MOTRIN) 600 mg tablet, Take 1 tablet (600 mg total) by mouth every 6 (six) hours as needed for mild pain or moderate pain, Disp: 30 tablet, Rfl: 0    norethindrone (Rukhsana) 0.35 MG tablet, Take 1 tablet (0.35 mg total) by mouth daily, Disp: 84 tablet, Rfl: 4    ondansetron (ZOFRAN-ODT) 4 mg disintegrating tablet, Take 1 tablet (4 mg total) by mouth every 6 (six) hours as needed for nausea or vomiting, Disp: 20 tablet, Rfl: 0    PARoxetine (PAXIL) 10 mg tablet, TAKE 1 TABLET (10 MG TOTAL) BY MOUTH EVERY MORNING., Disp: 90 tablet, Rfl: 1  Patient Active Problem List    Diagnosis Date Noted    Eczema 10/11/2023    Anxiety and depression 10/11/2023    Encounter for surveillance of contraceptive pills 04/19/2023     "Endometriosis 04/15/2019       Allergies   Allergen Reactions    Lactose - Food Allergy Abdominal Pain     Stomach pain no hives       OB History    Para Term  AB Living   0 0 0 0 0 0   SAB IAB Ectopic Multiple Live Births   0 0 0 0 0       Vitals:    10/15/24 1147   BP: 120/78   BP Location: Right arm   Patient Position: Sitting   Cuff Size: Large   Weight: 86.2 kg (190 lb)   Height: 5' 10\" (1.778 m)     Body mass index is 27.26 kg/m².    Review of Systems     Constitutional: Negative for chills, fatigue, fever, headaches, visual disturbances, and unexpected weight change.   Respiratory: Negative for cough, & shortness of breath.  Cardiovascular: Negative for chest pain. .    Gastrointestinal: Negative for Abd pain, nausea & vomiting, constipation and diarrhea.   Genitourinary: Negative for difficulty urinating, dysuria, hematuria, dyspareunia, unusual vaginal bleeding or discharge  Skin: Negative skin changes    Physical Exam     Constitutional: Alert & Oriented x3, well-developed and well-nourished. No distress.   HENT: Atraumatic, Normocephalic,   Neck: Normal range of motion.   Pulmonary: Effort normal.   Abdominal: Soft. No tenderness or masses  Musculoskeletal: Normal ROM  Skin: Warm & Dry  Psychological: Normal mood, thought content, behavior & judgement          "

## 2024-10-10 NOTE — PATIENT INSTRUCTIONS
"Prophylactic NSAID therapy for Painful or Heavy menses     Ibuprofen or Naproxen (chose 1 or the other, do not take both), Dose as noted on the box. Typically Ibuprofen dose is 600 mg, (3 tablets) every 6-8 hours. Typically Naproxen dose is 500 mg every 12 hours.  Start taking medication 2 days prior to onset of menses and continue taking through the first 3 days of menses. Make sure you take consistently this is important  You need to take with food to decrease any gastrointestinal upset effects    This is proven therapy to reduce you flow and cramping by 50 %    Life style changes that have a positive effect on painful and heavy periods are as follows   Daily physical exercise    Increase fiber, fresh fruits and vegetables in your diet    Increase daily water intake    Heating pads(do not apply directly to skin, apply over clothing or towel)   Warm Baths   Relaxation techniques, meditation, massage, yoga and mindfulness     These are all suggestion for improving your sense of frustrations with your menstrual cycle and improving your overall wellness and lifestyle Patient Education     Intrauterine devices (IUDs)   The Basics   Written by the doctors and editors at [facility]   What is an intrauterine device? -- An intrauterine device (\"IUD\") is a type of birth control. It is a small, T-shaped device. A doctor or nurse puts an IUD in your uterus by going through your vagina and cervix (figure 1).  IUDs are made of flexible plastic and have 2 thin plastic strings that hang out of the cervix. They are very small, a little more than 1 inch (2.5 cm) in width and length.  An IUD is one of the safest, most effective methods for preventing pregnancy. It is a good choice for people, including teens, who do not want to get pregnant for at least 1 year. An IUD can also be used to prevent pregnancy if it is put in within 5 days after you have unprotected sex. This is known as \"emergency contraception.\"  You can also use IUDs " "for reasons other than birth control. For example, 1 type of IUD can be used to treat heavy, painful periods.  What are the different types of IUDs? -- There are 2 categories of IUDs available in the [place]. One type contains copper. The other type contains a hormone called \"levonorgestrel\" (figure 2):   Copper IUD - There is only 1 copper-containing IUD (brand name: Paragard). It can stay in your uterus for 10 years, or longer for some people, to prevent pregnancy. Some people who use it get heavier or longer periods than they had before getting the IUD.   Hormonal IUDs - There are 4 hormone-containing IUDs (brand names: Mirena, Liletta, Kyleena, Cari) (figure 2). Depending on which of these you have, it can stay in your uterus for up to 8, 5, or 3 years. Many people who use hormonal IUDs have lighter, less painful periods than they had before getting the IUD. Some people stop getting a period at all, but this is not harmful. After having the IUD removed, periods usually return to normal within a month or 2.  Other types of IUDs are also available outside of the [place].  What are the benefits of using an IUD? -- The benefits of using an IUD include:   IUDs are very effective. Fewer than 1 in 100 people who use an IUD get pregnant during the first year of use.   You do not have to remember to do anything or take any birth control medicines on a regular basis.   IUDs have few side effects.   IUDs do not contain estrogen, a hormone that some people can't or don't want to take.   If you decide you want to get pregnant, you can have the IUD taken out.   If you use an IUD for several years, it costs less overall than many other types of birth control. That's because there are no costs after you have it inserted.   There is evidence that using an IUD lowers your risk of getting cervical cancer.  What are the downsides of using an IUD? -- The downsides of using an IUD include:   Unlike condoms, an IUD does not protect you against " "infections that you can catch during sex. These are called \"sexually transmitted infections\" (\"STIs\") or \"sexually transmitted diseases.\" But you and your partner(s) can use condoms to prevent spreading infections.   There is a small chance that the IUD will come out during your period. If this happens, you will need to get a new IUD. If you see your IUD in your underwear, on your pad, or in the toilet, call your doctor or nurse.   The initial cost is higher than the cost of other methods. But, there are no more costs after it is inserted.   Only a doctor or nurse can insert or remove an IUD.  You should not get an IUD if you recently had an infection that spread to your uterus and other nearby organs. This is called a \"pelvic infection.\" STIs such as chlamydia and gonorrhea can cause pelvic infections.  Which type of IUD is best for me? -- Your nurse or doctor can talk to you about the options and help you choose the right IUD for you.  A copper IUD might be a good choice if you:   Want or need to avoid hormones   Want to avoid big changes in your period, such as not having any periods or bleeding or spotting between periods   Want birth control for up to 10 years, or possibly longer  A hormonal IUD might be a good choice if you:   Have heavy, painful periods. These IUDs can make your periods lighter and less painful.   Have pelvic pain from a condition called \"endometriosis.\" These IUDs might help reduce the pain.   Want birth control for up to 8 years, depending on which device you choose  Does it hurt to have an IUD put in? -- You will likely feel some discomfort and slight cramping after the nurse or doctor puts the IUD in your uterus. People who have never given birth might feel more discomfort than people who have. The cramps generally go away within a day or 2. Over-the-counter pain medicines like ibuprofen (sample brand names: Advil, Motrin) or naproxen (sample brand name: Aleve) can help cramps go away " "faster.  After the IUD is in place, you should not be able to feel it.  Should I see a doctor or nurse? -- If you have an IUD, see your doctor or nurse right away if:   You have bad pain in your lower belly.   Your period is late or very different from normal.   You cannot feel the string of the IUD or if the string seems shorter than usual.   You think your IUD might have moved or fallen out.   You had sex with someone who has or might have an STI, or you think you have an STI.   You have an unexplained fever.  All topics are updated as new evidence becomes available and our peer review process is complete.  This topic retrieved from [employer] on: Feb 26, 2024.  Topic 68908 Version 21.0  Release: 32.2.4 - C32.56  © 2024 UpToDate, Inc. and/or its affiliates. All rights reserved.  figure 1: Female reproductive anatomy     The internal organs that make up the female reproductive system are located in the lower belly. These include the uterus, fallopian tubes, ovaries, cervix, and vagina.  The uterus has an inner lining, called the \"endometrium,\" and a thicker outer layer, called the \"myometrium.\"  Graphic 72278 Version 8.0  figure 2: IUDs     This picture shows 2 types of IUDs. There are different IUDs available. They are placed inside the uterus to help prevent pregnancy. Some hormonal IUDs can help reduce menstrual bleeding. The copper IUD can actually make menstrual bleeding heavier.  Graphic 81519 Version 15.0  Consumer Information Use and Disclaimer   Disclaimer: This generalized information is a limited summary of diagnosis, treatment, and/or medication information. It is not meant to be comprehensive and should be used as a tool to help the user understand and/or assess potential diagnostic and treatment options. It does NOT include all information about conditions, treatments, medications, side effects, or risks that may apply to a specific patient. It is not intended to be medical advice or a substitute for " the medical advice, diagnosis, or treatment of a health care provider based on the health care provider's examination and assessment of a patient's specific and unique circumstances. Patients must speak with a health care provider for complete information about their health, medical questions, and treatment options, including any risks or benefits regarding use of medications. This information does not endorse any treatments or medications as safe, effective, or approved for treating a specific patient. UpToDate, Inc. and its affiliates disclaim any warranty or liability relating to this information or the use thereof.The use of this information is governed by the Terms of Use, available at https://www.Mytopia.com/en/know/clinical-effectiveness-terms. 2024© UpToDate, Inc. and its affiliates and/or licensors. All rights reserved.  Copyright   © 2024 UpToDate, Inc. and/or its affiliates. All rights reserved.  Patient Education     Medroxyprogesterone (me DROKS ee proe REZA te roseanne)   Brand Names: [place] Depo-Provera; Depo-SubQ Provera 104; Provera   Brand Names: [place] Depo-Provera; Provera   Warning   Birth control and endometriosis pain:   Using this drug for birth control or endometriosis pain may cause bone loss. Bone loss is greater the longer the drug is used and may not go back to normal. It is not known what the effects will be on bones when used in teenagers and young adults. Do not use this drug for longer than 2 years unless other options will not work or cannot be used.  Hormone therapy (HT):   Do not use this drug with an estrogen to prevent heart disease or dementia. A study of women taking an estrogen with a progestin showed a raised chance of heart attack, stroke, breast cancer, a blood clot, and dementia.  Use this drug for the shortest time needed at the lowest useful dose. Your doctor will talk with you on a regular basis to see if you need to keep taking this drug.  What is this drug used for?    It is used to prevent pregnancy.  It is used to lower the chance of endometrial changes after menopause in people who are getting estrogen therapy.  It is used to treat pain caused by endometriosis.  It is used to treat uterine bleeding due to hormonal imbalance.  It is used to treat endometrial cancer.  It is used to treat kidney cancer.  It is used to treat people who do not have a monthly period cycle.  It may be given to you for other reasons. Talk with the doctor.  What do I need to tell my doctor BEFORE I take this drug?   If you are allergic to this drug; any part of this drug; or any other drugs, foods, or substances. Tell your doctor about the allergy and what signs you had.  If you have had any of these health problems: Bleeding disorder; blood clots or risk of having a blood clot; breast cancer or other cancer; liver disease; heart attack; stroke; or tumor where estrogen or progesterone make it grow.  If you have unexplained vaginal bleeding.  If you are pregnant or may be pregnant. Some forms of this drug are not for use during pregnancy.  If you are breast-feeding or plan to breast-feed.  This is not a list of all drugs or health problems that interact with this drug.  Tell your doctor and pharmacist about all of your drugs (prescription or OTC, natural products, vitamins) and health problems. You must check to make sure that it is safe for you to take this drug with all of your drugs and health problems. Do not start, stop, or change the dose of any drug without checking with your doctor.  What are some things I need to know or do while I take this drug?   For all uses of this drug:   Tell all of your health care providers that you take this drug. This includes your doctors, nurses, pharmacists, and dentists. This drug may need to be stopped before certain types of surgery as your doctor has told you. If this drug is stopped, your doctor will tell you when to start taking this drug again after your  surgery or procedure.  Blood clots have happened with this drug. These clots have included heart attack, stroke, and clots in the leg, lung, or eye. Sometimes blood clots can be deadly. Tell your doctor if you have ever had a blood clot. Talk with your doctor.  Talk with your doctor if you will need to be still for long periods of time like long trips, bedrest after surgery, or illness. Not moving for long periods may raise your chance of blood clots.  If you have high blood sugar (diabetes), you will need to watch your blood sugar closely.  High triglyceride levels have happened with this drug. Tell your doctor if you have ever had high triglyceride levels.  Have your blood work and bone density checked as you have been told by your doctor.  Take calcium and vitamin D as you were told by your doctor.  There may be a higher chance of breast cancer.  Be sure to have regular breast exams and gynecology check-ups. You will also need to do breast self-exams as you have been told.  This drug may affect certain lab tests. Tell all of your health care providers and lab workers that you take this drug.  This drug may cause dark patches of skin on your face. Avoid sun, sunlamps, and tanning beds. Use sunscreen and wear clothing and eyewear that protects you from the sun.  If you are 65 or older, use this drug with care. You could have more side effects.  All injection products:   If you will be trying to get pregnant, it may take some time after your last dose of this drug to get pregnant. Talk with your doctor.  Birth control and endometriosis pain:   If you are able to get pregnant, a pregnancy test will be done to show that you are NOT pregnant before starting this drug. Talk with your doctor.  If you get pregnant or have severe stomach pain while using this drug, call your doctor right away. The chance of pregnancy outside of the uterus (ectopic pregnancy) may be higher with this drug.  Birth control:   This drug does  not stop the spread of diseases like HIV or hepatitis that are passed through having sex. Do not have any kind of sex without using a latex or polyurethane condom. If you have questions, talk with your doctor.  Certain drugs or herbal products could cause this drug to not work as well. Be sure your doctor and pharmacist know about all of your drugs.  Hormone therapy (HT):   High blood pressure has happened with drugs like this one. Have your blood pressure checked as you have been told by your doctor.  Certain side effects like heart attack, stroke, breast cancer, and others have been seen in some people taking a certain estrogen with a progestin The risk may not be the same for everyone. Factors like how long the treatment is, if an estrogen is taken with or without a progestin, and other factors may affect the risk for certain side effects. Talk with your doctor about the benefits and risks of using this drug.  What are some side effects that I need to call my doctor about right away?   WARNING/CAUTION: Even though it may be rare, some people may have very bad and sometimes deadly side effects when taking a drug. Tell your doctor or get medical help right away if you have any of the following signs or symptoms that may be related to a very bad side effect:  For all uses of this drug:   Signs of an allergic reaction, like rash; hives; itching; red, swollen, blistered, or peeling skin with or without fever; wheezing; tightness in the chest or throat; trouble breathing, swallowing, or talking; unusual hoarseness; or swelling of the mouth, face, lips, tongue, or throat.  Signs of liver problems like dark urine, tiredness, decreased appetite, upset stomach or stomach pain, light-colored stools, throwing up, or yellow skin or eyes.  Weakness on 1 side of the body, trouble speaking or thinking, change in balance, drooping on one side of the face, or blurred eyesight.  Eyesight changes or loss, bulging eyes, or change in  how contact lenses feel.  A lump in the breast, breast pain or soreness, or nipple discharge.  Vaginal itching or discharge.  Vaginal bleeding that is not normal.  Depression or other mood changes.  Seizures.  This drug may cause you to swell or keep fluid in your body. Tell your doctor if you have swelling, weight gain, or trouble breathing.  Call your doctor right away if you have signs of a blood clot like chest pain or pressure; coughing up blood; shortness of breath; swelling, warmth, numbness, change of color, or pain in a leg or arm; or trouble speaking or swallowing.  Cancer treatment:   Signs of a weak adrenal gland like a severe upset stomach or throwing up, severe dizziness or passing out, muscle weakness, feeling very tired, mood changes, decreased appetite, or weight loss.  Signs of Cushing's disease like weight gain in the upper back or belly, moon face, very bad headache, or slow healing.  High calcium levels have happened with drugs like this one in some people with cancer. Call your doctor right away if you have signs of high calcium levels like weakness, confusion, feeling tired, headache, upset stomach or throwing up, constipation, or bone pain.  Hormone therapy (HT):   Signs of high blood pressure like very bad headache or dizziness, passing out, or change in eyesight.  Signs of gallbladder problems like pain in the upper right belly area, right shoulder area, or between the shoulder blades; yellow skin or eyes; fever with chills; bloating; or very upset stomach or throwing up.  Signs of a pancreas problem (pancreatitis) like very bad stomach pain, very bad back pain, or very bad upset stomach or throwing up.  Signs of low calcium levels like muscle cramps or spasms, numbness and tingling, or seizures.  Memory problems or loss.  Feeling confused.  What are some other side effects of this drug?   All drugs may cause side effects. However, many people have no side effects or only have minor side  effects. Call your doctor or get medical help if any of these side effects or any other side effects bother you or do not go away:  For all uses of this drug:   Weight gain or loss.  Headache.  Feeling dizzy, sleepy, tired, or weak.  Feeling nervous and excitable.  Upset stomach or throwing up.  Bloating.  Change in sex interest.  Pimples (acne).  Hair loss.  Hair growth.  Enlarged breasts.  Tender breasts.  Vaginal bleeding or spotting.  Stomach pain.  Trouble sleeping.  No period or other period (menstrual) changes.  All injection products:   Irritation where the shot is given.  Hormone therapy (HT):   Stomach cramps.  Joint pain.  Leg cramps.  These are not all of the side effects that may occur. If you have questions about side effects, call your doctor. Call your doctor for medical advice about side effects.  You may report side effects to your national health agency.  You may report side effects to the FDA at 1-978.668.7820. You may also report side effects at https://www.fda.gov/medwatch.  How is this drug best taken?   Use this drug as ordered by your doctor. Read all information given to you. Follow all instructions closely.  Tablets:   To gain the most benefit, do not miss doses.  Injection:   It is given as a shot into a muscle or into the fatty part of the skin.  What do I do if I miss a dose?   Tablets:   Take a missed dose as soon as you think about it.  If it is close to the time for your next dose, skip the missed dose and go back to your normal time.  Do not take 2 doses at the same time or extra doses.  All injection products:   Call your doctor to find out what to do.  How do I store and/or throw out this drug?   Tablets:   Store at room temperature in a dry place. Do not store in a bathroom.  All injection products:   Most of the time, this drug will be given in a hospital or doctor's office. If stored at home, follow how to store as you were told by the doctor.  All products:   Keep all drugs in  a safe place. Keep all drugs out of the reach of children and pets.  Throw away unused or  drugs. Do not flush down a toilet or pour down a drain unless you are told to do so. Check with your pharmacist if you have questions about the best way to throw out drugs. There may be drug take-back programs in your area.  General drug facts   If your symptoms or health problems do not get better or if they become worse, call your doctor.  Do not share your drugs with others and do not take anyone else's drugs.  Some drugs may have another patient information leaflet. If you have any questions about this drug, please talk with your doctor, nurse, pharmacist, or other health care provider.  Some drugs may have another patient information leaflet. Check with your pharmacist. If you have any questions about this drug, please talk with your doctor, nurse, pharmacist, or other health care provider.  If you think there has been an overdose, call your poison control center or get medical care right away. Be ready to tell or show what was taken, how much, and when it happened.  Consumer Information Use and Disclaimer   This generalized information is a limited summary of diagnosis, treatment, and/or medication information. It is not meant to be comprehensive and should be used as a tool to help the user understand and/or assess potential diagnostic and treatment options. It does NOT include all information about conditions, treatments, medications, side effects, or risks that may apply to a specific patient. It is not intended to be medical advice or a substitute for the medical advice, diagnosis, or treatment of a health care provider based on the health care provider's examination and assessment of a patient's specific and unique circumstances. Patients must speak with a health care provider for complete information about their health, medical questions, and treatment options, including any risks or benefits regarding use  "of medications. This information does not endorse any treatments or medications as safe, effective, or approved for treating a specific patient. UpToDate, Inc. and its affiliates disclaim any warranty or liability relating to this information or the use thereof. The use of this information is governed by the Terms of Use, available at https://www.AppSheetuwer.com/en/know/clinical-effectiveness-terms.  Last Reviewed Date   2024-03-26  Copyright   © 2024 UpToDate, Inc. and its affiliates and/or licensors. All rights reserved.      Patient Education     Endometriosis   The Basics   Written by the doctors and editors at [employer]   What is endometriosis? -- Endometriosis is a condition that can cause pain in the lower part of the belly and trouble getting pregnant.  The \"endometrium\" is the name for the inner lining of the uterus. In people with endometriosis, cells like those normally found in the endometrium grow outside of the uterus (figure 1). It is not known exactly how or why this happens. But when endometriosis cells grow, it causes inflammation inside the body. This can lead to symptoms.  What are the symptoms of endometriosis? -- Some people with endometriosis have no symptoms. But most have pain in the lower part of the belly that can occur:   Before or during monthly periods   Between monthly periods   During or after sex   When urinating or having a bowel movement (often during monthly periods)  Other symptoms of endometriosis can include:   Trouble getting pregnant   Growths on the ovaries that a doctor can feel during an exam  All of these symptoms can also be caused by conditions that are not endometriosis. But if you have any of these symptoms, tell your doctor or nurse.  Is there a test for endometriosis? -- Not yet. But your doctor or nurse might suspect that you have it by learning about your symptoms and doing an exam.  The only way to know for sure if you have endometriosis is for a doctor to do " "surgery and look for endometriosis tissue outside of the uterus.  How is endometriosis treated? -- Endometriosis can be treated in different ways. The right treatment for you depends on your symptoms and whether you want to be able to get pregnant in the future.  Doctors can use medicines to treat endometriosis. These include:   Pain medicines - You can take \"NSAID\" medicines such as ibuprofen (sample brand names: Advil, Motrin) to help with pain. But these medicines do not make the endometriosis go away.   Birth control medicines - Certain birth control medicines can help reduce pain symptoms. Options include oral pills, skin patches, vaginal rings, intrauterine devices (\"IUDs\"), implants, and injections. These treatments are not appropriate if you are trying to get pregnant.   Medicines that stop monthly periods - These medicines stop the body from producing certain hormones. They can help if birth control medicines do not relieve symptoms. If you need them for longer than 6 months, your doctor might also have you take other hormones.  Some people choose to have surgery to treat endometriosis. Different types of surgery include:   Laparoscopy - In this type of surgery, a doctor will make a small cut (incision) in the belly and put a tube with a camera (called a \"laparoscope\") inside the body. Then, they can see and remove endometriosis tissue.   Hysterectomy - If no other treatments work, doctors might suggest a hysterectomy. This is surgery to remove the uterus. Hysterectomy is usually only done if nothing else has helped. Sometimes, the doctor will also remove the ovaries and tubes that connect the ovaries to the uterus (fallopian tubes) (figure 2). You cannot get pregnant after your uterus is removed.  What if I am having trouble getting pregnant? -- If you are having trouble getting pregnant, talk with your doctor or nurse. There are different medicines and treatments that can help.  All topics are updated as " "new evidence becomes available and our peer review process is complete.  This topic retrieved from [employer] on: Feb 26, 2024.  Topic 07059 Version 12.0  Release: 32.2.4 - C32.56  © 2024 UpToDate, Inc. and/or its affiliates. All rights reserved.  figure 1: Areas where endometriosis can be found     This figure shows some of the areas in the body (purple spots) where endometriosis can be found. Common areas affected by endometriosis include the ovaries, the tubes connecting the ovaries to the uterus (fallopian tubes), and the bowel. Endometriosis can also grow in front, in back, and to the sides of the uterus. Sometimes the doctor can feel the tissue when doing a pelvic exam.  Graphic 96914 Version 5.0  figure 2: Types of abdominal hysterectomy     In an abdominal hysterectomy, the doctor removes the uterus through an opening in the belly. If it is a \"total hysterectomy,\" the doctor also removes the cervix. If it is a \"subtotal\" or \"supracervical\" hysterectomy, the doctor removes the uterus but leaves the cervix in place.  To do this surgery, doctors sometimes make a horizontal cut (from left to right) at the bikini line. Sometimes, they instead make a vertical cut from top to bottom. As part of a hysterectomy, doctors sometimes also remove the ovaries and the tubes that connect the ovaries to the uterus (fallopian tubes). This is called \"salpingo-oophorectomy.\"  Graphic 40116 Version 7.0  Consumer Information Use and Disclaimer   Disclaimer: This generalized information is a limited summary of diagnosis, treatment, and/or medication information. It is not meant to be comprehensive and should be used as a tool to help the user understand and/or assess potential diagnostic and treatment options. It does NOT include all information about conditions, treatments, medications, side effects, or risks that may apply to a specific patient. It is not intended to be medical advice or a substitute for the medical advice, " diagnosis, or treatment of a health care provider based on the health care provider's examination and assessment of a patient's specific and unique circumstances. Patients must speak with a health care provider for complete information about their health, medical questions, and treatment options, including any risks or benefits regarding use of medications. This information does not endorse any treatments or medications as safe, effective, or approved for treating a specific patient. UpToDate, Inc. and its affiliates disclaim any warranty or liability relating to this information or the use thereof.The use of this information is governed by the Terms of Use, available at https://www.[employer].NumberPicture/en/know/clinical-effectiveness-terms. 2024© UpToDate, Inc. and its affiliates and/or licensors. All rights reserved.  Copyright   © 2024 UpToDate, Inc. and/or its affiliates. All rights reserved.  Patient Education     Pelvic Pain   About this topic   Pelvic pain is pain below the belly button and above the legs. It may be acute and last a few hours or a few days or much longer. If the pain lasts longer than 3 months it is chronic pelvic pain.  Your pain may be sharp, dull, or cramping. It may be there all the time or may come and go. Sometimes, the pain builds up over a short time. You may feel the pain throughout this area of your body or in one specific area. It may be mild, moderate, or severe.  Pain can cause upset stomach and throwing up. When you are in pain you may not feel hungry. You may feel nervous. Pain may be a warning sign that something is wrong inside the body. Acute pelvic pain may be caused by a very serious health problem.  How your pelvic pain is treated is based on many things. Some of them are the kind of pain, how bad it is, and what is causing the pain. Treatment may include drugs or surgery.  What are the causes?   Problems in the belly or bowels like:  Blocked  bowel  Appendicitis  Diverticulitis  Hernia  Hard stools  Irritable bowel syndrome  Peritonitis  Problems in the urinary tract like:  Urinary tract infection  Kidney or bladder stones  Narrowing of the urethra  Other causes like:  Muscle strain or spasm  Herniated disc  Bone problems  Hernias  Pelvic abscess  Pelvic inflammatory disease  Sexually transmitted disease  Nerve problems  Adhesions after surgery  IUD is in the wrong place  Problems with internal reproductive organs like:  Miscarriage or ectopic pregnancy  Ovarian cyst breaks open  Vaginal infection  Pelvic inflammatory disease or sexually transmitted disease  Problems with monthly periods  Ovarian or testicular torsion  Endometriosis or fibroids  Ovulation pain  Prostate problems  Pain after a permanent birth control surgery  What are the main signs?   Tenderness in lower belly  Fever  Upset stomach and throwing up  Dizziness  Sweating  Feeling anxious  How does the doctor diagnose this health problem?   Your doctor will take your history and do an exam. If you have a vagina, this will likely include a pelvic exam with a tool called a speculum. Your doctor will ask about your pain to help find where it is located. The doctor may want to do some tests to find the cause of your pain. The doctor may order:  Lab tests  Pelvic exam  Ultrasound  X-ray  CT or MRI scan  Intravenous pyelography  Barium enema  Laparoscopy  How does the doctor treat this health problem?   Treatment is based on what is causing your pain. This may include changes in your diet or physical or sexual activity.  Are there other health problems to treat?   Depending on what is causing the pain, there may be other problems to treat.  What drugs may be needed?   The doctor may order drugs to:  Help with pain  Fight an infection  Balance hormones  Relax muscles  Lower stress and anxiety or help with depression  What can be done to prevent this health problem?   There is nothing you can do to  prevent some of these problems.  Always practice safe sex by using condoms.  Manage digestive problems like constipation.  Last Reviewed Date   2021-09-14  Consumer Information Use and Disclaimer   This generalized information is a limited summary of diagnosis, treatment, and/or medication information. It is not meant to be comprehensive and should be used as a tool to help the user understand and/or assess potential diagnostic and treatment options. It does NOT include all information about conditions, treatments, medications, side effects, or risks that may apply to a specific patient. It is not intended to be medical advice or a substitute for the medical advice, diagnosis, or treatment of a health care provider based on the health care provider's examination and assessment of a patient’s specific and unique circumstances. Patients must speak with a health care provider for complete information about their health, medical questions, and treatment options, including any risks or benefits regarding use of medications. This information does not endorse any treatments or medications as safe, effective, or approved for treating a specific patient. UpToDate, Inc. and its affiliates disclaim any warranty or liability relating to this information or the use thereof. The use of this information is governed by the Terms of Use, available at https://www.[employer]tersEnergyDeckuwer.com/en/know/clinical-effectiveness-terms   Copyright   Copyright © 2024 UpToDate, Inc. and its affiliates and/or licensors. All rights reserved.

## 2024-10-15 ENCOUNTER — OFFICE VISIT (OUTPATIENT)
Dept: OBGYN CLINIC | Facility: CLINIC | Age: 29
End: 2024-10-15
Payer: COMMERCIAL

## 2024-10-15 VITALS
DIASTOLIC BLOOD PRESSURE: 78 MMHG | WEIGHT: 190 LBS | BODY MASS INDEX: 27.2 KG/M2 | SYSTOLIC BLOOD PRESSURE: 120 MMHG | HEIGHT: 70 IN

## 2024-10-15 DIAGNOSIS — N80.9 ENDOMETRIOSIS: Primary | ICD-10-CM

## 2024-10-15 DIAGNOSIS — R10.2 PELVIC PAIN: ICD-10-CM

## 2024-10-15 PROCEDURE — 99213 OFFICE O/P EST LOW 20 MIN: CPT | Performed by: OBSTETRICS & GYNECOLOGY

## 2024-10-21 ENCOUNTER — TELEPHONE (OUTPATIENT)
Age: 29
End: 2024-10-21

## 2024-10-21 NOTE — TELEPHONE ENCOUNTER
Scheduled MsJm Rachid for her 1st Depo Provera Injection on Wednesday, October 23, 2024 @8:00am in the [facility].

## 2024-10-21 NOTE — TELEPHONE ENCOUNTER
Pt called looking to schedule depo injection, as discussed with provider, Vanessa Burgos, during today's office visit. Attempted warm transfer to [place] office to assist with scheduling, all associates currently busy. Please provide a call back to assist with scheduling depo injection. Pt will also need a script sent to her Pharmacy for the depo.    Thank you.

## 2024-10-23 ENCOUNTER — TELEPHONE (OUTPATIENT)
Dept: OBGYN CLINIC | Facility: CLINIC | Age: 29
End: 2024-10-23

## 2024-10-23 DIAGNOSIS — N80.9 ENDOMETRIOSIS: Primary | ICD-10-CM

## 2024-10-23 RX ORDER — MEDROXYPROGESTERONE ACETATE 150 MG/ML
150 INJECTION, SUSPENSION INTRAMUSCULAR
Qty: 1 ML | Refills: 3 | Status: SHIPPED | OUTPATIENT
Start: 2024-10-23

## 2024-10-23 NOTE — TELEPHONE ENCOUNTER
Patient came in for 1st depo inj today. Script was never sent to pharmacy and also unaware she had to pick it up from pharmacy. Please send depo to pharmacy before 10/30 appt.

## 2024-10-30 ENCOUNTER — CLINICAL SUPPORT (OUTPATIENT)
Dept: OBGYN CLINIC | Facility: CLINIC | Age: 29
End: 2024-10-30
Payer: COMMERCIAL

## 2024-10-30 VITALS
BODY MASS INDEX: 27.72 KG/M2 | DIASTOLIC BLOOD PRESSURE: 70 MMHG | SYSTOLIC BLOOD PRESSURE: 110 MMHG | HEIGHT: 70 IN | WEIGHT: 193.6 LBS

## 2024-10-30 DIAGNOSIS — Z32.02 NEGATIVE PREGNANCY TEST: ICD-10-CM

## 2024-10-30 DIAGNOSIS — Z30.42 ENCOUNTER FOR DEPO-PROVERA CONTRACEPTION: Primary | ICD-10-CM

## 2024-10-30 LAB — SL AMB POCT URINE HCG: NORMAL

## 2024-10-30 PROCEDURE — 96372 THER/PROPH/DIAG INJ SC/IM: CPT | Performed by: OBSTETRICS & GYNECOLOGY

## 2024-10-30 PROCEDURE — 81025 URINE PREGNANCY TEST: CPT

## 2024-10-30 RX ORDER — MEDROXYPROGESTERONE ACETATE 150 MG/ML
150 INJECTION, SUSPENSION INTRAMUSCULAR ONCE
Status: COMPLETED | OUTPATIENT
Start: 2024-10-30 | End: 2024-10-30

## 2024-10-30 RX ADMIN — MEDROXYPROGESTERONE ACETATE 150 MG: 150 INJECTION, SUSPENSION INTRAMUSCULAR at 09:12

## 2024-10-30 NOTE — PROGRESS NOTES
Pt is here for Depo Provera injection. Her first  dose was 10/30/2024.  Her annual exam was on 4/19/2024.  Urine pregnancy test done:YES   Result: NEG  Depo given in Left deltoid   Tolerated well. YES she waited in the lobby for 15 minutes then she was ok to leave .  Lot [id number] Exp 12/31/2026  Next dose due 1/15/69252/29/2025.   Refill needed NO      Discharged

## 2024-11-06 DIAGNOSIS — F41.9 ANXIETY: ICD-10-CM

## 2024-11-06 RX ORDER — ALPRAZOLAM 0.5 MG
0.5 TABLET ORAL 3 TIMES DAILY PRN
Qty: 90 TABLET | Refills: 0 | OUTPATIENT
Start: 2024-11-06

## 2024-11-06 NOTE — TELEPHONE ENCOUNTER
Medication: ALPRAZolam (XANAX) 0.5 mg tablet     Dose/Frequency: Take 1 tablet (0.5 mg total) by mouth 3 (three) times a day as needed for anxiety     Quantity: 90    Pharmacy: [facility]    Office:   [x] PCP/Provider -   [] Speciality/Provider -     Does the patient have enough for 3 days?   [x] Yes   [] No - Send as HP to POD

## 2024-11-11 ENCOUNTER — OFFICE VISIT (OUTPATIENT)
Age: 29
End: 2024-11-11
Payer: COMMERCIAL

## 2024-11-11 VITALS
HEART RATE: 78 BPM | SYSTOLIC BLOOD PRESSURE: 118 MMHG | TEMPERATURE: 98.1 F | OXYGEN SATURATION: 97 % | DIASTOLIC BLOOD PRESSURE: 70 MMHG | BODY MASS INDEX: 26.92 KG/M2 | WEIGHT: 188 LBS | RESPIRATION RATE: 18 BRPM | HEIGHT: 70 IN

## 2024-11-11 DIAGNOSIS — Z11.4 SCREENING FOR HIV (HUMAN IMMUNODEFICIENCY VIRUS): ICD-10-CM

## 2024-11-11 DIAGNOSIS — F90.9 ATTENTION DEFICIT HYPERACTIVITY DISORDER (ADHD), UNSPECIFIED ADHD TYPE: ICD-10-CM

## 2024-11-11 DIAGNOSIS — F41.9 ANXIETY: Primary | ICD-10-CM

## 2024-11-11 DIAGNOSIS — L30.8 OTHER ECZEMA: ICD-10-CM

## 2024-11-11 DIAGNOSIS — Z11.59 NEED FOR HEPATITIS C SCREENING TEST: ICD-10-CM

## 2024-11-11 PROCEDURE — 99213 OFFICE O/P EST LOW 20 MIN: CPT | Performed by: STUDENT IN AN ORGANIZED HEALTH CARE EDUCATION/TRAINING PROGRAM

## 2024-11-11 RX ORDER — CLOBETASOL PROPIONATE 0.5 MG/G
CREAM TOPICAL 2 TIMES DAILY
Qty: 60 G | Refills: 1 | Status: SHIPPED | OUTPATIENT
Start: 2024-11-11

## 2024-11-11 RX ORDER — ALPRAZOLAM 0.5 MG
0.5 TABLET ORAL 3 TIMES DAILY PRN
Qty: 90 TABLET | Refills: 0 | Status: SHIPPED | OUTPATIENT
Start: 2024-11-11

## 2024-11-11 RX ORDER — PAROXETINE 10 MG/1
10 TABLET, FILM COATED ORAL EVERY MORNING
Qty: 90 TABLET | Refills: 1 | Status: SHIPPED | OUTPATIENT
Start: 2024-11-11

## 2024-11-11 NOTE — PROGRESS NOTES
Ambulatory Visit  Name: Cindi Rashid      : 1995      MRN: [id number]  Encounter Provider: Tavares Gupta MD  Encounter Date: 2024   Encounter department: [facility]    Assessment & Plan  Anxiety  Well controlled on current dose of Paxil and Xanax. Continue same. Refer to Psychiatry.    Orders:    ALPRAZolam (XANAX) 0.5 mg tablet; Take 1 tablet (0.5 mg total) by mouth 3 (three) times a day as needed for anxiety    Ambulatory referral to Psych Services; Future    Attention deficit hyperactivity disorder (ADHD), unspecified ADHD type  Well controlled on current dose of Paxil and Xanax. Continue same. Refer to Psychiatry.    Orders:    PARoxetine (PAXIL) 10 mg tablet; Take 1 tablet (10 mg total) by mouth every morning    Ambulatory referral to Psych Services; Future    Other eczema  Patient reports history of eczema that only responds to clobetasol. Patient aware to only use for short periods of time during flares alternating with breaks from the medication to reduce risk of skin thinning.    Orders:    clobetasol (TEMOVATE) 0.05 % cream; Apply topically 2 (two) times a day    Need for hepatitis C screening test    Orders:    Hepatitis C antibody; Future    Screening for HIV (human immunodeficiency virus)    Orders:    HIV 1/2 AG/AB w Reflex [facility] for 2 yr old and above; Future       History of Present Illness     Cindi Rashid is a 28 yo F with PMH of anxiety, PTSD, ADHD, eczema, and endometriosis who presents today for medication refill. Her anxiety and mood have been stable on her current doses of paxil and Xanax.          Review of Systems   Respiratory:  Negative for shortness of breath.    Cardiovascular:  Negative for chest pain.   Skin:  Positive for rash.   Neurological:  Negative for dizziness, light-headedness and headaches.   Psychiatric/Behavioral:  Negative for dysphoric mood and suicidal ideas. The patient is nervous/anxious.      Medical History  Reviewed by provider this encounter:  Meds  Problems       Past Medical History   Past Medical History:   Diagnosis Date    Anxiety 2012    Depression 2012    Endometriosis     Scoliosis 2013    Scoliosis    Urinary tract infection 2014    Chronic     Past Surgical History:   Procedure Laterality Date    ENDOMETRIAL BIOPSY      LAPAROSCOPY  2020    NOSE SURGERY      TONSILLECTOMY       Family History   Problem Relation Age of Onset    No Known Problems Mother     Hypertension Father     Arthritis Father     Anxiety disorder Father     No Known Problems Sister     No Known Problems Sister     Skin cancer Maternal Grandmother     Heart disease Maternal Grandmother     No Known Problems Maternal Grandfather     Breast cancer Paternal Grandmother 50    Hypertension Paternal Grandmother     Diabetes Paternal Grandmother     Breast cancer additional onset Paternal Grandmother     Stroke Paternal Grandfather     No Known Problems Maternal Aunt     No Known Problems Paternal Aunt     ADD / ADHD Sister     Schizophrenia Cousin      Current Outpatient Medications on File Prior to Visit   Medication Sig Dispense Refill    ibuprofen (MOTRIN) 600 mg tablet Take 1 tablet (600 mg total) by mouth every 6 (six) hours as needed for mild pain or moderate pain 30 tablet 0    medroxyPROGESTERone (DEPO-PROVERA) 150 mg/mL injection Inject 1 mL (150 mg total) into a muscle every 3 (three) months 1 mL 3    ondansetron (ZOFRAN-ODT) 4 mg disintegrating tablet Take 1 tablet (4 mg total) by mouth every 6 (six) hours as needed for nausea or vomiting 20 tablet 0    [DISCONTINUED] ALPRAZolam (XANAX) 0.5 mg tablet Take 1 tablet (0.5 mg total) by mouth 3 (three) times a day as needed for anxiety 90 tablet 0    [DISCONTINUED] clobetasol (TEMOVATE) 0.05 % cream Apply topically 2 (two) times a day 60 g 0    [DISCONTINUED] PARoxetine (PAXIL) 10 mg tablet TAKE 1 TABLET (10 MG TOTAL) BY MOUTH EVERY MORNING. 90 tablet 1    [DISCONTINUED] norethindrone  (Rukhsana) 0.35 MG tablet Take 1 tablet (0.35 mg total) by mouth daily 84 tablet 4     No current facility-administered medications on file prior to visit.     Allergies   Allergen Reactions    Lactose - Food Allergy Abdominal Pain     Stomach pain no hives      Current Outpatient Medications on File Prior to Visit   Medication Sig Dispense Refill    ibuprofen (MOTRIN) 600 mg tablet Take 1 tablet (600 mg total) by mouth every 6 (six) hours as needed for mild pain or moderate pain 30 tablet 0    medroxyPROGESTERone (DEPO-PROVERA) 150 mg/mL injection Inject 1 mL (150 mg total) into a muscle every 3 (three) months 1 mL 3    ondansetron (ZOFRAN-ODT) 4 mg disintegrating tablet Take 1 tablet (4 mg total) by mouth every 6 (six) hours as needed for nausea or vomiting 20 tablet 0    [DISCONTINUED] ALPRAZolam (XANAX) 0.5 mg tablet Take 1 tablet (0.5 mg total) by mouth 3 (three) times a day as needed for anxiety 90 tablet 0    [DISCONTINUED] clobetasol (TEMOVATE) 0.05 % cream Apply topically 2 (two) times a day 60 g 0    [DISCONTINUED] PARoxetine (PAXIL) 10 mg tablet TAKE 1 TABLET (10 MG TOTAL) BY MOUTH EVERY MORNING. 90 tablet 1    [DISCONTINUED] norethindrone (Rukhsana) 0.35 MG tablet Take 1 tablet (0.35 mg total) by mouth daily 84 tablet 4     No current facility-administered medications on file prior to visit.      Social History     Tobacco Use    Smoking status: Never    Smokeless tobacco: Never    Tobacco comments:     I vape daily   Vaping Use    Vaping status: Former    Substances: Nicotine   Substance and Sexual Activity    Alcohol use: Yes     Alcohol/week: 2.0 standard drinks of alcohol     Types: 2 Glasses of wine per week     Comment: 1-2x per week    Drug use: Never    Sexual activity: Yes     Partners: Male     Birth control/protection: Coitus interruptus, Other     Comment: Pill         Objective     /70 (BP Location: Right arm, Patient Position: Sitting, Cuff Size: Standard)   Pulse 78   Temp 98.1 °F  "(36.7 °C) (Tympanic)   Resp 18   Ht 5' 10\" (1.778 m)   Wt 85.3 kg (188 lb)   LMP 09/23/2024 (Exact Date)   SpO2 97%   BMI 26.98 kg/m²     Physical Exam  Constitutional:       General: She is not in acute distress.  Eyes:      Conjunctiva/sclera: Conjunctivae normal.   Cardiovascular:      Rate and Rhythm: Normal rate and regular rhythm.      Heart sounds: Normal heart sounds.   Pulmonary:      Effort: Pulmonary effort is normal.      Breath sounds: Normal breath sounds.   Musculoskeletal:      Right lower leg: No edema.      Left lower leg: No edema.   Skin:     General: Skin is warm and dry.   Neurological:      Mental Status: She is alert.   Psychiatric:         Speech: Speech normal.         Behavior: Behavior normal. Behavior is cooperative.         "

## 2024-11-11 NOTE — ASSESSMENT & PLAN NOTE
Patient reports history of eczema that only responds to clobetasol. Patient aware to only use for short periods of time during flares alternating with breaks from the medication to reduce risk of skin thinning.    Orders:    clobetasol (TEMOVATE) 0.05 % cream; Apply topically 2 (two) times a day

## 2024-11-13 ENCOUNTER — TELEPHONE (OUTPATIENT)
Age: 29
End: 2024-11-13

## 2024-11-13 NOTE — TELEPHONE ENCOUNTER
Jianr attempted to contact pt regarding referral for [facility] to verify services needed and schedule an appt. Lvm to call jianr back.

## 2024-12-09 DIAGNOSIS — F41.9 ANXIETY: ICD-10-CM

## 2024-12-09 RX ORDER — ALPRAZOLAM 0.5 MG
0.5 TABLET ORAL 3 TIMES DAILY PRN
Qty: 90 TABLET | Refills: 0 | Status: SHIPPED | OUTPATIENT
Start: 2024-12-09

## 2024-12-09 NOTE — TELEPHONE ENCOUNTER
Patient needs a refill of the following medication:    ALPRAZolam (XANAX) 0.5 mg tablet   Take 1 tablet (0.5 mg total) by mouth 3 (three) times a day as needed for anxiety     Medication can be sent to:  [facility]/pharmacy #[id number] - [place], [place] - [address] [phone]   **Please note new pharmacy    Please advise, thank you.

## 2024-12-12 ENCOUNTER — TELEPHONE (OUTPATIENT)
Age: 29
End: 2024-12-12

## 2024-12-12 NOTE — TELEPHONE ENCOUNTER
Contacted Pt. in regards to ROUTINE Referral, LVM to contact [phone] to discuss services needed at this time in order to be added to proper wait list.

## 2025-01-07 ENCOUNTER — TELEPHONE (OUTPATIENT)
Age: 30
End: 2025-01-07

## 2025-01-07 DIAGNOSIS — Z87.898 H/O MOTION SICKNESS: Primary | ICD-10-CM

## 2025-01-07 DIAGNOSIS — F41.9 ANXIETY: ICD-10-CM

## 2025-01-07 RX ORDER — ALPRAZOLAM 0.5 MG
0.5 TABLET ORAL 3 TIMES DAILY PRN
Qty: 90 TABLET | Refills: 0 | Status: SHIPPED | OUTPATIENT
Start: 2025-01-07

## 2025-01-07 RX ORDER — SCOLOPAMINE TRANSDERMAL SYSTEM 1 MG/1
1 PATCH, EXTENDED RELEASE TRANSDERMAL
Qty: 3 PATCH | Refills: 0 | Status: SHIPPED | OUTPATIENT
Start: 2025-01-07

## 2025-01-07 NOTE — TELEPHONE ENCOUNTER
Patient is going on a cruise and gets motion sickness.  She is requesting Scoeolamine 1 mg patch for motion sickness.  Patient is requesting 3 patches to get her through the cruise. Patient states she has used this before and it has worked.    Patch could be sent to pharmacy on file.    Please advise, thank you.

## 2025-01-07 NOTE — TELEPHONE ENCOUNTER
Patient needs a refill of the following medication:    ALPRAZolam (XANAX) 0.5 mg tablet   Take 1 tablet (0.5 mg total) by mouth 3 (three) times a day as needed for anxiety     Medication can be sent to pharmacy on file.    Please advise, thank you.

## 2025-01-22 ENCOUNTER — CLINICAL SUPPORT (OUTPATIENT)
Dept: OBGYN CLINIC | Facility: CLINIC | Age: 30
End: 2025-01-22
Payer: COMMERCIAL

## 2025-01-22 VITALS — WEIGHT: 189 LBS | BODY MASS INDEX: 27.12 KG/M2

## 2025-01-22 DIAGNOSIS — Z30.42 ENCOUNTER FOR DEPO-PROVERA CONTRACEPTION: Primary | ICD-10-CM

## 2025-01-22 PROCEDURE — 96372 THER/PROPH/DIAG INJ SC/IM: CPT

## 2025-01-22 RX ORDER — MEDROXYPROGESTERONE ACETATE 150 MG/ML
150 INJECTION, SUSPENSION INTRAMUSCULAR ONCE
Status: COMPLETED | OUTPATIENT
Start: 2025-01-22 | End: 2025-01-22

## 2025-01-22 RX ADMIN — MEDROXYPROGESTERONE ACETATE 150 MG: 150 INJECTION, SUSPENSION INTRAMUSCULAR at 16:58

## 2025-01-22 NOTE — PATIENT INSTRUCTIONS
Patient Education     Medroxyprogesterone (me DROKS ee marilyn cronin)   Brand Names: US Depo-Provera; Depo-SubQ Provera 104; Provera   Brand Names: Gini Depo-Provera; Provera   Warning   Birth control and endometriosis pain:   Using this drug for birth control or endometriosis pain may cause bone loss. Bone loss is greater the longer the drug is used and may not go back to normal. It is not known what the effects will be on bones when used in teenagers and young adults. Do not use this drug for longer than 2 years unless other options will not work or cannot be used.  Hormone therapy (HT):   Do not use this drug with an estrogen to prevent heart disease or dementia. A study of women taking an estrogen with a progestin showed a raised chance of heart attack, stroke, breast cancer, a blood clot, and dementia.  Use this drug for the shortest time needed at the lowest useful dose. Your doctor will talk with you on a regular basis to see if you need to keep taking this drug.  What is this drug used for?   It is used to prevent pregnancy.  It is used to lower the chance of endometrial changes after menopause in people who are getting estrogen therapy.  It is used to treat pain caused by endometriosis.  It is used to treat uterine bleeding due to hormonal imbalance.  It is used to treat endometrial cancer.  It is used to treat kidney cancer.  It is used to treat people who do not have a monthly period cycle.  It may be given to you for other reasons. Talk with the doctor.  What do I need to tell my doctor BEFORE I take this drug?   If you are allergic to this drug; any part of this drug; or any other drugs, foods, or substances. Tell your doctor about the allergy and what signs you had.  If you have had any of these health problems: Bleeding disorder; blood clots or risk of having a blood clot; breast cancer or other cancer; liver disease; heart attack; stroke; or tumor where estrogen or progesterone make it grow.  If  you have unexplained vaginal bleeding.  If you are pregnant or may be pregnant. Some forms of this drug are not for use during pregnancy.  If you are breast-feeding or plan to breast-feed.  This is not a list of all drugs or health problems that interact with this drug.  Tell your doctor and pharmacist about all of your drugs (prescription or OTC, natural products, vitamins) and health problems. You must check to make sure that it is safe for you to take this drug with all of your drugs and health problems. Do not start, stop, or change the dose of any drug without checking with your doctor.  What are some things I need to know or do while I take this drug?   For all uses of this drug:   Tell all of your health care providers that you take this drug. This includes your doctors, nurses, pharmacists, and dentists. This drug may need to be stopped before certain types of surgery as your doctor has told you. If this drug is stopped, your doctor will tell you when to start taking this drug again after your surgery or procedure.  Blood clots have happened with this drug. These clots have included heart attack, stroke, and clots in the leg, lung, or eye. Sometimes blood clots can be deadly. Tell your doctor if you have ever had a blood clot. Talk with your doctor.  Talk with your doctor if you will need to be still for long periods of time like long trips, bedrest after surgery, or illness. Not moving for long periods may raise your chance of blood clots.  If you have high blood sugar (diabetes), you will need to watch your blood sugar closely.  High triglyceride levels have happened with this drug. Tell your doctor if you have ever had high triglyceride levels.  Have your blood work and bone density checked as you have been told by your doctor.  Take calcium and vitamin D as you were told by your doctor.  There may be a higher chance of breast cancer.  Be sure to have regular breast exams and gynecology check-ups. You  will also need to do breast self-exams as you have been told.  This drug may affect certain lab tests. Tell all of your health care providers and lab workers that you take this drug.  This drug may cause dark patches of skin on your face. Avoid sun, sunlamps, and tanning beds. Use sunscreen and wear clothing and eyewear that protects you from the sun.  If you are 65 or older, use this drug with care. You could have more side effects.  All injection products:   If you will be trying to get pregnant, it may take some time after your last dose of this drug to get pregnant. Talk with your doctor.  Birth control and endometriosis pain:   If you are able to get pregnant, a pregnancy test will be done to show that you are NOT pregnant before starting this drug. Talk with your doctor.  If you get pregnant or have severe stomach pain while using this drug, call your doctor right away. The chance of pregnancy outside of the uterus (ectopic pregnancy) may be higher with this drug.  Birth control:   This drug does not stop the spread of diseases like HIV or hepatitis that are passed through having sex. Do not have any kind of sex without using a latex or polyurethane condom. If you have questions, talk with your doctor.  Certain drugs or herbal products could cause this drug to not work as well. Be sure your doctor and pharmacist know about all of your drugs.  Hormone therapy (HT):   High blood pressure has happened with drugs like this one. Have your blood pressure checked as you have been told by your doctor.  Certain side effects like heart attack, stroke, breast cancer, and others have been seen in some people taking a certain estrogen with a progestin The risk may not be the same for everyone. Factors like how long the treatment is, if an estrogen is taken with or without a progestin, and other factors may affect the risk for certain side effects. Talk with your doctor about the benefits and risks of using this drug.  What  are some side effects that I need to call my doctor about right away?   WARNING/CAUTION: Even though it may be rare, some people may have very bad and sometimes deadly side effects when taking a drug. Tell your doctor or get medical help right away if you have any of the following signs or symptoms that may be related to a very bad side effect:  For all uses of this drug:   Signs of an allergic reaction, like rash; hives; itching; red, swollen, blistered, or peeling skin with or without fever; wheezing; tightness in the chest or throat; trouble breathing, swallowing, or talking; unusual hoarseness; or swelling of the mouth, face, lips, tongue, or throat.  Signs of liver problems like dark urine, tiredness, decreased appetite, upset stomach or stomach pain, light-colored stools, throwing up, or yellow skin or eyes.  Weakness on 1 side of the body, trouble speaking or thinking, change in balance, drooping on one side of the face, or blurred eyesight.  Eyesight changes or loss, bulging eyes, or change in how contact lenses feel.  A lump in the breast, breast pain or soreness, or nipple discharge.  Vaginal itching or discharge.  Vaginal bleeding that is not normal.  Depression or other mood changes.  Seizures.  This drug may cause you to swell or keep fluid in your body. Tell your doctor if you have swelling, weight gain, or trouble breathing.  Call your doctor right away if you have signs of a blood clot like chest pain or pressure; coughing up blood; shortness of breath; swelling, warmth, numbness, change of color, or pain in a leg or arm; or trouble speaking or swallowing.  Cancer treatment:   Signs of a weak adrenal gland like a severe upset stomach or throwing up, severe dizziness or passing out, muscle weakness, feeling very tired, mood changes, decreased appetite, or weight loss.  Signs of Cushing's disease like weight gain in the upper back or belly, moon face, very bad headache, or slow healing.  High calcium  levels have happened with drugs like this one in some people with cancer. Call your doctor right away if you have signs of high calcium levels like weakness, confusion, feeling tired, headache, upset stomach or throwing up, constipation, or bone pain.  Hormone therapy (HT):   Signs of high blood pressure like very bad headache or dizziness, passing out, or change in eyesight.  Signs of gallbladder problems like pain in the upper right belly area, right shoulder area, or between the shoulder blades; yellow skin or eyes; fever with chills; bloating; or very upset stomach or throwing up.  Signs of a pancreas problem (pancreatitis) like very bad stomach pain, very bad back pain, or very bad upset stomach or throwing up.  Signs of low calcium levels like muscle cramps or spasms, numbness and tingling, or seizures.  Memory problems or loss.  Feeling confused.  What are some other side effects of this drug?   All drugs may cause side effects. However, many people have no side effects or only have minor side effects. Call your doctor or get medical help if any of these side effects or any other side effects bother you or do not go away:  For all uses of this drug:   Weight gain or loss.  Headache.  Feeling dizzy, sleepy, tired, or weak.  Feeling nervous and excitable.  Upset stomach or throwing up.  Bloating.  Change in sex interest.  Pimples (acne).  Hair loss.  Hair growth.  Enlarged breasts.  Tender breasts.  Vaginal bleeding or spotting.  Stomach pain.  Trouble sleeping.  No period or other period (menstrual) changes.  All injection products:   Irritation where the shot is given.  Hormone therapy (HT):   Stomach cramps.  Joint pain.  Leg cramps.  These are not all of the side effects that may occur. If you have questions about side effects, call your doctor. Call your doctor for medical advice about side effects.  You may report side effects to your national health agency.  You may report side effects to the FDA at  5-365-761-2885. You may also report side effects at https://www.fda.gov/medwatch.  How is this drug best taken?   Use this drug as ordered by your doctor. Read all information given to you. Follow all instructions closely.  Tablets:   To gain the most benefit, do not miss doses.  Injection:   It is given as a shot into a muscle or into the fatty part of the skin.  What do I do if I miss a dose?   Tablets:   Take a missed dose as soon as you think about it.  If it is close to the time for your next dose, skip the missed dose and go back to your normal time.  Do not take 2 doses at the same time or extra doses.  All injection products:   Call your doctor to find out what to do.  How do I store and/or throw out this drug?   Tablets:   Store at room temperature in a dry place. Do not store in a bathroom.  All injection products:   Most of the time, this drug will be given in a hospital or doctor's office. If stored at home, follow how to store as you were told by the doctor.  All products:   Keep all drugs in a safe place. Keep all drugs out of the reach of children and pets.  Throw away unused or  drugs. Do not flush down a toilet or pour down a drain unless you are told to do so. Check with your pharmacist if you have questions about the best way to throw out drugs. There may be drug take-back programs in your area.  General drug facts   If your symptoms or health problems do not get better or if they become worse, call your doctor.  Do not share your drugs with others and do not take anyone else's drugs.  Some drugs may have another patient information leaflet. If you have any questions about this drug, please talk with your doctor, nurse, pharmacist, or other health care provider.  Some drugs may have another patient information leaflet. Check with your pharmacist. If you have any questions about this drug, please talk with your doctor, nurse, pharmacist, or other health care provider.  If you think there has  been an overdose, call your poison control center or get medical care right away. Be ready to tell or show what was taken, how much, and when it happened.  Consumer Information Use and Disclaimer   This generalized information is a limited summary of diagnosis, treatment, and/or medication information. It is not meant to be comprehensive and should be used as a tool to help the user understand and/or assess potential diagnostic and treatment options. It does NOT include all information about conditions, treatments, medications, side effects, or risks that may apply to a specific patient. It is not intended to be medical advice or a substitute for the medical advice, diagnosis, or treatment of a health care provider based on the health care provider's examination and assessment of a patient's specific and unique circumstances. Patients must speak with a health care provider for complete information about their health, medical questions, and treatment options, including any risks or benefits regarding use of medications. This information does not endorse any treatments or medications as safe, effective, or approved for treating a specific patient. UpToDate, Inc. and its affiliates disclaim any warranty or liability relating to this information or the use thereof. The use of this information is governed by the Terms of Use, available at https://www.woltersScholaroouwer.com/en/know/clinical-effectiveness-terms.  Last Reviewed Date   2024-03-26  Copyright   © 2024 UpToDate, Inc. and its affiliates and/or licensors. All rights reserved.

## 2025-01-22 NOTE — PROGRESS NOTES
Pt is here for Depo Provera injection. Her last dose was   Her annual exam was on 04/19/2024  Depo given in L Deltoid   Tolerated well.  Lot [id number] Exp 04/2027  Next dose due04/8-4/22  Refill needed

## 2025-02-05 DIAGNOSIS — F41.9 ANXIETY: ICD-10-CM

## 2025-02-05 NOTE — TELEPHONE ENCOUNTER
Patient called to request a refill on     ALPRAZolam (XANAX) 0.5 mg tablet         Patients preferred pharmacy is: [facility]/pharmacy #[id number] - [facility] [place] - [address] [phone]

## 2025-02-06 RX ORDER — ALPRAZOLAM 0.5 MG
0.5 TABLET ORAL 3 TIMES DAILY PRN
Qty: 90 TABLET | Refills: 0 | Status: SHIPPED | OUTPATIENT
Start: 2025-02-06

## 2025-03-05 DIAGNOSIS — F41.9 ANXIETY: ICD-10-CM

## 2025-03-05 NOTE — TELEPHONE ENCOUNTER
Requested Prescriptions     Pending Prescriptions Disp Refills    ALPRAZolam (XANAX) 0.5 mg tablet 90 tablet 0     Sig: Take 1 tablet (0.5 mg total) by mouth 3 (three) times a day as needed for anxiety     [facility]/pharmacy #[id number] - [facility] [place] - [address] [phone]

## 2025-03-07 RX ORDER — ALPRAZOLAM 0.5 MG
0.5 TABLET ORAL 3 TIMES DAILY PRN
Qty: 90 TABLET | Refills: 0 | Status: SHIPPED | OUTPATIENT
Start: 2025-03-07

## 2025-04-07 DIAGNOSIS — F41.9 ANXIETY: ICD-10-CM

## 2025-04-08 RX ORDER — ALPRAZOLAM 0.5 MG
0.5 TABLET ORAL 3 TIMES DAILY PRN
Qty: 90 TABLET | Refills: 0 | Status: SHIPPED | OUTPATIENT
Start: 2025-04-08

## 2025-04-17 ENCOUNTER — CLINICAL SUPPORT (OUTPATIENT)
Age: 30
End: 2025-04-17
Payer: COMMERCIAL

## 2025-04-17 VITALS
DIASTOLIC BLOOD PRESSURE: 70 MMHG | WEIGHT: 186.6 LBS | SYSTOLIC BLOOD PRESSURE: 102 MMHG | BODY MASS INDEX: 26.71 KG/M2 | HEIGHT: 70 IN

## 2025-04-17 DIAGNOSIS — Z30.42 ENCOUNTER FOR MANAGEMENT AND INJECTION OF DEPO-PROVERA: Primary | ICD-10-CM

## 2025-04-17 PROCEDURE — 96372 THER/PROPH/DIAG INJ SC/IM: CPT

## 2025-04-17 RX ORDER — MEDROXYPROGESTERONE ACETATE 150 MG/ML
150 INJECTION, SUSPENSION INTRAMUSCULAR ONCE
Status: COMPLETED | OUTPATIENT
Start: 2025-04-17 | End: 2025-04-17

## 2025-04-17 RX ADMIN — MEDROXYPROGESTERONE ACETATE 150 MG: 150 INJECTION, SUSPENSION INTRAMUSCULAR at 15:47

## 2025-04-17 NOTE — PROGRESS NOTES
Pt is here for Depo Provera injection. Her last dose was 1/22/2025.  Her annual exam was on 4/19/2024.  Urine pregnancy test done: No   Result: N/A  Depo given in L Deltoid  Tolerated well.  Lot [id number] 4/30/2027  Next dose due 7/3-7/17/2025.   Refill needed No

## 2025-05-03 DIAGNOSIS — F90.9 ATTENTION DEFICIT HYPERACTIVITY DISORDER (ADHD), UNSPECIFIED ADHD TYPE: ICD-10-CM

## 2025-05-04 RX ORDER — PAROXETINE 10 MG/1
10 TABLET, FILM COATED ORAL EVERY MORNING
Qty: 90 TABLET | Refills: 1 | Status: SHIPPED | OUTPATIENT
Start: 2025-05-04

## 2025-05-06 DIAGNOSIS — F41.9 ANXIETY: ICD-10-CM

## 2025-05-07 RX ORDER — ALPRAZOLAM 0.5 MG
0.5 TABLET ORAL 3 TIMES DAILY PRN
Qty: 90 TABLET | Refills: 0 | Status: SHIPPED | OUTPATIENT
Start: 2025-05-07

## 2025-05-25 DIAGNOSIS — Z87.898 H/O MOTION SICKNESS: ICD-10-CM

## 2025-05-27 RX ORDER — SCOPOLAMINE 1 MG/3D
1 PATCH, EXTENDED RELEASE TRANSDERMAL
Qty: 3 PATCH | Refills: 0 | Status: SHIPPED | OUTPATIENT
Start: 2025-05-27

## 2025-06-05 ENCOUNTER — APPOINTMENT (OUTPATIENT)
Age: 30
End: 2025-06-05
Payer: COMMERCIAL

## 2025-06-05 ENCOUNTER — OFFICE VISIT (OUTPATIENT)
Age: 30
End: 2025-06-05
Payer: COMMERCIAL

## 2025-06-05 VITALS
HEIGHT: 70 IN | HEART RATE: 74 BPM | TEMPERATURE: 97.6 F | DIASTOLIC BLOOD PRESSURE: 78 MMHG | SYSTOLIC BLOOD PRESSURE: 116 MMHG | OXYGEN SATURATION: 97 % | WEIGHT: 188 LBS | RESPIRATION RATE: 18 BRPM | BODY MASS INDEX: 26.92 KG/M2

## 2025-06-05 DIAGNOSIS — F41.9 ANXIETY AND DEPRESSION: ICD-10-CM

## 2025-06-05 DIAGNOSIS — F32.A ANXIETY AND DEPRESSION: ICD-10-CM

## 2025-06-05 DIAGNOSIS — Z00.00 ANNUAL PHYSICAL EXAM: Primary | ICD-10-CM

## 2025-06-05 DIAGNOSIS — Z00.00 ANNUAL PHYSICAL EXAM: ICD-10-CM

## 2025-06-05 DIAGNOSIS — L30.8 OTHER ECZEMA: ICD-10-CM

## 2025-06-05 LAB
ALBUMIN SERPL BCG-MCNC: 4.7 G/DL (ref 3.5–5)
ALP SERPL-CCNC: 61 U/L (ref 34–104)
ALT SERPL W P-5'-P-CCNC: 19 U/L (ref 7–52)
ANION GAP SERPL CALCULATED.3IONS-SCNC: 9 MMOL/L (ref 4–13)
AST SERPL W P-5'-P-CCNC: 21 U/L (ref 13–39)
BASOPHILS # BLD AUTO: 0.05 THOUSANDS/ÂΜL (ref 0–0.1)
BASOPHILS NFR BLD AUTO: 1 % (ref 0–1)
BILIRUB SERPL-MCNC: 0.24 MG/DL (ref 0.2–1)
BUN SERPL-MCNC: 15 MG/DL (ref 5–25)
CALCIUM SERPL-MCNC: 9.5 MG/DL (ref 8.4–10.2)
CHLORIDE SERPL-SCNC: 104 MMOL/L (ref 96–108)
CHOLEST SERPL-MCNC: 166 MG/DL (ref ?–200)
CO2 SERPL-SCNC: 26 MMOL/L (ref 21–32)
CREAT SERPL-MCNC: 0.81 MG/DL (ref 0.6–1.3)
EOSINOPHIL # BLD AUTO: 0.11 THOUSAND/ÂΜL (ref 0–0.61)
EOSINOPHIL NFR BLD AUTO: 1 % (ref 0–6)
ERYTHROCYTE [DISTWIDTH] IN BLOOD BY AUTOMATED COUNT: 12.1 % (ref 11.6–15.1)
GFR SERPL CREATININE-BSD FRML MDRD: 98 ML/MIN/1.73SQ M
GLUCOSE SERPL-MCNC: 94 MG/DL (ref 65–140)
HCT VFR BLD AUTO: 42.1 % (ref 34.8–46.1)
HDLC SERPL-MCNC: 53 MG/DL
HGB BLD-MCNC: 13.6 G/DL (ref 11.5–15.4)
IMM GRANULOCYTES # BLD AUTO: 0.03 THOUSAND/UL (ref 0–0.2)
IMM GRANULOCYTES NFR BLD AUTO: 0 % (ref 0–2)
LDLC SERPL CALC-MCNC: 75 MG/DL (ref 0–100)
LYMPHOCYTES # BLD AUTO: 2.54 THOUSANDS/ÂΜL (ref 0.6–4.47)
LYMPHOCYTES NFR BLD AUTO: 33 % (ref 14–44)
MCH RBC QN AUTO: 30.7 PG (ref 26.8–34.3)
MCHC RBC AUTO-ENTMCNC: 32.3 G/DL (ref 31.4–37.4)
MCV RBC AUTO: 95 FL (ref 82–98)
MONOCYTES # BLD AUTO: 0.6 THOUSAND/ÂΜL (ref 0.17–1.22)
MONOCYTES NFR BLD AUTO: 8 % (ref 4–12)
NEUTROPHILS # BLD AUTO: 4.39 THOUSANDS/ÂΜL (ref 1.85–7.62)
NEUTS SEG NFR BLD AUTO: 57 % (ref 43–75)
NRBC BLD AUTO-RTO: 0 /100 WBCS
PLATELET # BLD AUTO: 245 THOUSANDS/UL (ref 149–390)
PMV BLD AUTO: 12.4 FL (ref 8.9–12.7)
POTASSIUM SERPL-SCNC: 3.9 MMOL/L (ref 3.5–5.3)
PROT SERPL-MCNC: 7.5 G/DL (ref 6.4–8.4)
RBC # BLD AUTO: 4.43 MILLION/UL (ref 3.81–5.12)
SODIUM SERPL-SCNC: 139 MMOL/L (ref 135–147)
TRIGL SERPL-MCNC: 188 MG/DL (ref ?–150)
WBC # BLD AUTO: 7.72 THOUSAND/UL (ref 4.31–10.16)

## 2025-06-05 PROCEDURE — 85025 COMPLETE CBC W/AUTO DIFF WBC: CPT

## 2025-06-05 PROCEDURE — 36415 COLL VENOUS BLD VENIPUNCTURE: CPT | Performed by: FAMILY MEDICINE

## 2025-06-05 PROCEDURE — 80061 LIPID PANEL: CPT | Performed by: FAMILY MEDICINE

## 2025-06-05 PROCEDURE — 99214 OFFICE O/P EST MOD 30 MIN: CPT | Performed by: FAMILY MEDICINE

## 2025-06-05 PROCEDURE — 99395 PREV VISIT EST AGE 18-39: CPT | Performed by: FAMILY MEDICINE

## 2025-06-05 PROCEDURE — 80053 COMPREHEN METABOLIC PANEL: CPT | Performed by: FAMILY MEDICINE

## 2025-06-05 NOTE — PROGRESS NOTES
Adult Annual Physical  Name: Cindi Rashid      : 1995      MRN: [id number]  Encounter Provider: Zoraida Rajan DO  Encounter Date: 2025   Encounter department: [facility] [place]    :  Assessment & Plan  Annual physical exam    Orders:    Comprehensive metabolic panel    Lipid Panel with Direct LDL reflex    CBC and differential; Future    Anxiety and depression  Stable. Ready to decrease xanax use with goal to transistion to klonopin daily. Will decrease dose monlty until 30 days supply then will start klnopin 0.5mg qd.          Other eczema  Alleviated as needed  clobetasol use.  Patient to continue                      Preventive Screenings:  - Diabetes Screening: screening up-to-date  - Cervical cancer screening: screening up-to-date   - Lung cancer screening: screening not indicated     Counseling/Anticipatory Guidance:    - Injury prevention: discussed safety/seat belts, safety helmets, smoke detectors, carbon monoxide detectors, and smoking near bedding or upholstery.       Depression Screening and Follow-up Plan: Patient was screened for depression during today's encounter. They screened negative with a PHQ-9 score of 0.          History of Present Illness     Adult Annual Physical:  Patient presents for annual physical.     Diet and Physical Activity:  - Diet/Nutrition: well balanced diet.  - Exercise: no formal exercise.    Depression Screening:    - PHQ-9 Score: 0    General Health:  - Sleep: 4-6 hours of sleep on average.  - Hearing: normal hearing bilateral ears.  - Vision: wears contacts.    /GYN Health:    - Menopause: premenopausal.     Review of Systems   Respiratory:  Negative for shortness of breath.    Cardiovascular:  Negative for chest pain and palpitations.   Gastrointestinal:  Positive for diarrhea. Negative for constipation.   Neurological:  Negative for headaches.   Psychiatric/Behavioral:  Negative for sleep disturbance.          Objective  "  /78 (BP Location: Left arm, Patient Position: Sitting, Cuff Size: Standard)   Pulse 74   Temp 97.6 °F (36.4 °C) (Tympanic)   Resp 18   Ht 5' 10\" (1.778 m)   Wt 85.3 kg (188 lb)   SpO2 97%   BMI 26.98 kg/m²     Physical Exam  Vitals and nursing note reviewed.   Constitutional:       General: She is not in acute distress.     Appearance: She is well-developed.   HENT:      Head: Normocephalic and atraumatic.      Right Ear: External ear normal.      Left Ear: External ear normal.      Mouth/Throat:      Pharynx: Oropharynx is clear.     Eyes:      Conjunctiva/sclera: Conjunctivae normal.      Pupils: Pupils are equal, round, and reactive to light.       Cardiovascular:      Rate and Rhythm: Normal rate and regular rhythm.      Heart sounds: No murmur heard.  Pulmonary:      Effort: Pulmonary effort is normal. No respiratory distress.      Breath sounds: Normal breath sounds.   Abdominal:      Palpations: Abdomen is soft.      Tenderness: There is no abdominal tenderness.     Musculoskeletal:         General: No swelling.     Skin:     General: Skin is warm and dry.      Capillary Refill: Capillary refill takes less than 2 seconds.     Neurological:      Mental Status: She is alert and oriented to person, place, and time.      Gait: Gait normal.     Psychiatric:         Mood and Affect: Mood normal.         Behavior: Behavior normal.         "

## 2025-06-05 NOTE — PATIENT INSTRUCTIONS
"Patient Education     Routine physical for adults   The Basics   Written by the doctors and editors at [facility]   What is a physical? -- A physical is a routine visit, or \"check-up,\" with your doctor. You might also hear it called a \"wellness visit\" or \"preventive visit.\"  During each visit, the doctor will:   Ask about your physical and mental health   Ask about your habits, behaviors, and lifestyle   Do an exam   Give you vaccines if needed   Talk to you about any medicines you take   Give advice about your health   Answer your questions  Getting regular check-ups is an important part of taking care of your health. It can help your doctor find and treat any problems you have. But it's also important for preventing health problems.  A routine physical is different from a \"sick visit.\" A sick visit is when you see a doctor because of a health concern or problem. Since physicals are scheduled ahead of time, you can think about what you want to ask the doctor.  How often should I get a physical? -- It depends on your age and health. In general, for people age 21 years and older:   If you are younger than 50 years, you might be able to get a physical every 3 years.   If you are 50 years or older, your doctor might recommend a physical every year.  If you have an ongoing health condition, like diabetes or high blood pressure, your doctor will probably want to see you more often.  What happens during a physical? -- In general, each visit will include:   Physical exam - The doctor or nurse will check your height, weight, heart rate, and blood pressure. They will also look at your eyes and ears. They will ask about how you are feeling and whether you have any symptoms that bother you.   Medicines - It's a good idea to bring a list of all the medicines you take to each doctor visit. Your doctor will talk to you about your medicines and answer any questions. Tell them if you are having any side effects that bother you. You " "should also tell them if you are having trouble paying for any of your medicines.   Habits and behaviors - This includes:   Your diet   Your exercise habits   Whether you smoke, drink alcohol, or use drugs   Whether you are sexually active   Whether you feel safe at home  Your doctor will talk to you about things you can do to improve your health and lower your risk of health problems. They will also offer help and support. For example, if you want to quit smoking, they can give you advice and might prescribe medicines. If you want to improve your diet or get more physical activity, they can help you with this, too.   Lab tests, if needed - The tests you get will depend on your age and situation. For example, your doctor might want to check your:   Cholesterol   Blood sugar   Iron level   Vaccines - The recommended vaccines will depend on your age, health, and what vaccines you already had. Vaccines are very important because they can prevent certain serious or deadly infections.   Discussion of screening - \"Screening\" means checking for diseases or other health problems before they cause symptoms. Your doctor can recommend screening based on your age, risk, and preferences. This might include tests to check for:   Cancer, such as breast, prostate, cervical, ovarian, colorectal, prostate, lung, or skin cancer   Sexually transmitted infections, such as chlamydia and gonorrhea   Mental health conditions like depression and anxiety  Your doctor will talk to you about the different types of screening tests. They can help you decide which screenings to have. They can also explain what the results might mean.   Answering questions - The physical is a good time to ask the doctor or nurse questions about your health. If needed, they can refer you to other doctors or specialists, too.  Adults older than 65 years often need other care, too. As you get older, your doctor will talk to you about:   How to prevent falling at " home   Hearing or vision tests   Memory testing   How to take your medicines safely   Making sure that you have the help and support you need at home  All topics are updated as new evidence becomes available and our peer review process is complete.  This topic retrieved from [employer] on: May 02, 2024.  Topic 441110 Version 1.0  Release: 32.4.3 - C32.122  © 2024 UpToDate, Inc. and/or its affiliates. All rights reserved.  Consumer Information Use and Disclaimer   Disclaimer: This generalized information is a limited summary of diagnosis, treatment, and/or medication information. It is not meant to be comprehensive and should be used as a tool to help the user understand and/or assess potential diagnostic and treatment options. It does NOT include all information about conditions, treatments, medications, side effects, or risks that may apply to a specific patient. It is not intended to be medical advice or a substitute for the medical advice, diagnosis, or treatment of a health care provider based on the health care provider's examination and assessment of a patient's specific and unique circumstances. Patients must speak with a health care provider for complete information about their health, medical questions, and treatment options, including any risks or benefits regarding use of medications. This information does not endorse any treatments or medications as safe, effective, or approved for treating a specific patient. UpToDate, Inc. and its affiliates disclaim any warranty or liability relating to this information or the use thereof.The use of this information is governed by the Terms of Use, available at https://www.woltersSÃ‚Â² Developmentuwer.com/en/know/clinical-effectiveness-terms. 2024© UpToDate, Inc. and its affiliates and/or licensors. All rights reserved.  Copyright   © 2024 UpToDate, Inc. and/or its affiliates. All rights reserved.

## 2025-06-06 ENCOUNTER — RESULTS FOLLOW-UP (OUTPATIENT)
Age: 30
End: 2025-06-06

## 2025-06-06 NOTE — TELEPHONE ENCOUNTER
----- Message from Zoraida Rajan DO sent at 6/6/2025  8:58 AM EDT -----  Please notify that her her blood work was good. Her triglycerides were slightly elevated and that is due  to not fasting. All other cholesterol levels were normal so that is reassuring.   ----- Message -----  From: Lab, Background User  Sent: 6/5/2025  10:40 PM EDT  To: Zoraida Raajn DO

## 2025-06-10 DIAGNOSIS — F41.9 ANXIETY: ICD-10-CM

## 2025-06-11 RX ORDER — ALPRAZOLAM 0.5 MG
0.5 TABLET ORAL 3 TIMES DAILY PRN
Qty: 70 TABLET | Refills: 0 | Status: SHIPPED | OUTPATIENT
Start: 2025-06-11

## 2025-07-10 DIAGNOSIS — F41.9 ANXIETY: ICD-10-CM

## 2025-07-11 RX ORDER — ALPRAZOLAM 0.5 MG
0.5 TABLET ORAL 3 TIMES DAILY PRN
Qty: 60 TABLET | Refills: 0 | Status: SHIPPED | OUTPATIENT
Start: 2025-07-11

## 2025-08-08 ENCOUNTER — ANNUAL EXAM (OUTPATIENT)
Dept: OBGYN CLINIC | Facility: CLINIC | Age: 30
End: 2025-08-08
Payer: COMMERCIAL

## 2025-08-08 VITALS
BODY MASS INDEX: 26.34 KG/M2 | WEIGHT: 184 LBS | SYSTOLIC BLOOD PRESSURE: 110 MMHG | DIASTOLIC BLOOD PRESSURE: 70 MMHG | HEIGHT: 70 IN

## 2025-08-08 DIAGNOSIS — Z32.02 NEGATIVE PREGNANCY TEST: ICD-10-CM

## 2025-08-08 DIAGNOSIS — Z01.419 ENCOUNTER FOR GYNECOLOGICAL EXAMINATION WITHOUT ABNORMAL FINDING: Primary | ICD-10-CM

## 2025-08-08 PROCEDURE — 99395 PREV VISIT EST AGE 18-39: CPT | Performed by: OBSTETRICS & GYNECOLOGY

## 2025-08-08 PROCEDURE — 81025 URINE PREGNANCY TEST: CPT | Performed by: OBSTETRICS & GYNECOLOGY
